# Patient Record
Sex: FEMALE | Race: WHITE | NOT HISPANIC OR LATINO | Employment: FULL TIME | URBAN - METROPOLITAN AREA
[De-identification: names, ages, dates, MRNs, and addresses within clinical notes are randomized per-mention and may not be internally consistent; named-entity substitution may affect disease eponyms.]

---

## 2019-09-11 ENCOUNTER — OFFICE VISIT (OUTPATIENT)
Dept: CARDIOLOGY | Facility: CLINIC | Age: 69
End: 2019-09-11

## 2019-09-11 VITALS
DIASTOLIC BLOOD PRESSURE: 84 MMHG | HEART RATE: 64 BPM | SYSTOLIC BLOOD PRESSURE: 128 MMHG | BODY MASS INDEX: 32.74 KG/M2 | WEIGHT: 216 LBS | HEIGHT: 68 IN

## 2019-09-11 DIAGNOSIS — E78.5 HYPERLIPIDEMIA, UNSPECIFIED HYPERLIPIDEMIA TYPE: ICD-10-CM

## 2019-09-11 DIAGNOSIS — Z79.01 CURRENT USE OF LONG TERM ANTICOAGULATION: ICD-10-CM

## 2019-09-11 DIAGNOSIS — I10 ESSENTIAL HYPERTENSION: ICD-10-CM

## 2019-09-11 DIAGNOSIS — I48.0 PAROXYSMAL ATRIAL FIBRILLATION (HCC): Primary | ICD-10-CM

## 2019-09-11 DIAGNOSIS — I49.3 PREMATURE VENTRICULAR CONTRACTIONS: ICD-10-CM

## 2019-09-11 PROCEDURE — 99213 OFFICE O/P EST LOW 20 MIN: CPT | Performed by: INTERNAL MEDICINE

## 2019-09-11 RX ORDER — CARVEDILOL 6.25 MG/1
6.25 TABLET ORAL 2 TIMES DAILY WITH MEALS
COMMUNITY
End: 2020-03-11

## 2019-09-11 NOTE — PROGRESS NOTES
[facility] [place] [facility]        Subjective:     Encounter Date:09/11/2019      Patient ID: Mami Wilson is a 69 y.o. female.      HPI: I saw Mami Wilson    today for cardiovascular care.  She is a pleasant 69-year-old female with a history of atrial fibrillation on chronic anticoagulation with Xarelto.  She remains active and is free of chest pain pressure tightness.  She does not report progressive dyspnea on exertion nor does she expands orthopnea or PND no syncope near syncope or any significant palpitations.  She tolerates her anticoagulation well without significant bruising or bleeding.  She does have obstructive sleep apnea and uses CPAP routinely.  She has a history of dyslipidemia and remains on simvastatin 20 mg/day her lipids are monitored by Dr. Rufino Carbajal, her primary care physician.  Fasting lipids from 8/14/2019 revealed triglycerides 81 HDL 64 LDL 97, liver function tests within normal limits.    She has a history of ventricular ectopy that was symptomatic but have has been very well controlled with magnesium supplementation.  She underwent nuclear stress testing 1/30/2019 which revealed preserved left ventricular function very small defect in the mid anterior wall, she is remained asymptomatic.  Last echocardiogram obtained in 2017 also reveals preserved left ventricular function, mild mitral tricuspid insufficiency.      The following portions of the patient's history were reviewed and updated as appropriate: allergies, current medications, past family history, past medical history, past social history, past surgical history and problem list.    Problem List:  Patient Active Problem List   Diagnosis   • Hyperlipidemia   • Neuropathy   • Hypertension   • Atrial fibrillation (CMS/HCC)   • GERD (gastroesophageal reflux disease)   • History of echocardiogram   • History of nuclear stress test   • History of cardiac monitoring   • Current use of long term anticoagulation   • Premature ventricular  contractions       Past Medical History:  Past Medical History:   Diagnosis Date   • Atrial fibrillation (CMS/HCC)    • GERD (gastroesophageal reflux disease)    • History of cardiac monitoring     5/17/18 - Abnormal holter monitor for frequent short run of atrial tachycardia/atrial fibrillation with frequent premature ventricular ectopy.   • History of echocardiogram     8/09/17 - LV normal size. Mild MR and TR. EF 61%.  4/03/14 - STRESS ECHO - Normal treadmill stress. No echocardiographic evidence of myocardial ischemia.   • History of nuclear stress test     01/30/2019--Fair exercise tolerance. Baseline HTN with hypertensive response. EKG changes sinus bradycardia. Small reversible defect in the mid anterior wall. EF 68%. Patient went 6min and 11 sec on Branden protocol.   • Hyperlipidemia    • Hypertension    • Neuropathy        Past Surgical History:  Past Surgical History:   Procedure Laterality Date   • BLADDER REPAIR     • CATARACT EXTRACTION     • KNEE ARTHROSCOPY     • TONSILLECTOMY         Social History:  Social History     Socioeconomic History   • Marital status:      Spouse name: Not on file   • Number of children: Not on file   • Years of education: Not on file   • Highest education level: Not on file   Tobacco Use   • Smoking status: Former Smoker       Allergies:  Allergies   Allergen Reactions   • Digoxin Unknown (See Comments)     .         ROS:  Review of Systems   Constitution: Negative for weakness and malaise/fatigue.   HENT: Negative for hearing loss and nosebleeds.    Eyes: Negative for double vision and visual disturbance.   Cardiovascular: Negative for chest pain, claudication, dyspnea on exertion, near-syncope, orthopnea, palpitations, paroxysmal nocturnal dyspnea and syncope.   Respiratory: Negative for cough, shortness of breath, sleep disturbances due to breathing, snoring, sputum production and wheezing.    Endocrine: Negative for cold intolerance, heat intolerance, polydipsia  "and polyuria.   Hematologic/Lymphatic: Negative for adenopathy and bleeding problem. Does not bruise/bleed easily.   Skin: Negative for flushing and itching.   Musculoskeletal: Negative for back pain, falls, joint pain, joint swelling, muscle weakness and neck pain.   Gastrointestinal: Negative for abdominal pain, dysphagia, heartburn, nausea and vomiting.   Genitourinary: Negative for dysuria and frequency.   Neurological: Negative for disturbances in coordination, dizziness, light-headedness, loss of balance and numbness.   Psychiatric/Behavioral: Negative for altered mental status and memory loss. The patient is not nervous/anxious.    Allergic/Immunologic: Negative for hives and persistent infections.          Objective:         /84   Pulse 64   Ht 172.7 cm (68\")   Wt 98 kg (216 lb)   BMI 32.84 kg/m²     Physical Exam   Constitutional: She is oriented to person, place, and time. She appears well-developed and well-nourished.   HENT:   Head: Normocephalic and atraumatic.   Eyes: Conjunctivae and EOM are normal. Pupils are equal, round, and reactive to light.   Neck: Neck supple. No thyromegaly present.   Cardiovascular: Normal rate, regular rhythm, S1 normal, S2 normal and intact distal pulses. PMI is not displaced. Exam reveals gallop and S4. Exam reveals no S3, no distant heart sounds, no friction rub, no midsystolic click and no opening snap.   No murmur heard.  Pulses:       Carotid pulses are 2+ on the right side, and 2+ on the left side.       Radial pulses are 2+ on the right side, and 2+ on the left side.        Femoral pulses are 2+ on the right side, and 2+ on the left side.       Dorsalis pedis pulses are 2+ on the right side, and 2+ on the left side.        Posterior tibial pulses are 2+ on the right side, and 2+ on the left side.   Pulmonary/Chest: Effort normal and breath sounds normal. No respiratory distress. She has no wheezes. She has no rales.   Abdominal: Soft. Bowel sounds are " normal. She exhibits no distension and no mass. There is no tenderness.   Musculoskeletal: Normal range of motion. She exhibits no edema.   Neurological: She is alert and oriented to person, place, and time.   Skin: Skin is warm and dry. No erythema.   Psychiatric: She has a normal mood and affect.       [facility] Procedure(s):  Procedures    ASCVD RIsk Score::  The ASCVD Risk score (Hermelinda MCKEON Jr., et al., 2013) failed to calculate for the following reasons:    Cannot find a previous HDL lab    Cannot find a previous total cholesterol lab        Assessment/Plan:         1. Paroxysmal atrial fibrillation (CMS/HCC)  Stable    2. Current use of long term anticoagulation  Well-tolerated    3. Essential hypertension  Controlled     4. Hyperlipidemia, unspecified hyperlipidemia type  Controlled    5. Premature ventricular contractions   Resolved      Ms. Wilson is stable from a cardiovascular standpoint.  She is free of angina euvolemic and active.  I made no changes in her medications.  We stressed importance of continued salt restriction as well as observing a low-cholesterol low saturated fat diet.  I will see her in follow-up 6 months sooner if needed.       Miller Gonzales MD  09/11/19  .

## 2020-03-11 ENCOUNTER — OFFICE VISIT (OUTPATIENT)
Dept: CARDIOLOGY | Facility: CLINIC | Age: 70
End: 2020-03-11

## 2020-03-11 VITALS
HEART RATE: 64 BPM | HEIGHT: 68 IN | SYSTOLIC BLOOD PRESSURE: 174 MMHG | WEIGHT: 230 LBS | DIASTOLIC BLOOD PRESSURE: 100 MMHG | BODY MASS INDEX: 34.86 KG/M2

## 2020-03-11 DIAGNOSIS — E66.09 CLASS 2 OBESITY DUE TO EXCESS CALORIES WITHOUT SERIOUS COMORBIDITY WITH BODY MASS INDEX (BMI) OF 35.0 TO 35.9 IN ADULT: ICD-10-CM

## 2020-03-11 DIAGNOSIS — I49.3 PREMATURE VENTRICULAR CONTRACTIONS: ICD-10-CM

## 2020-03-11 DIAGNOSIS — I48.0 PAROXYSMAL ATRIAL FIBRILLATION (HCC): Primary | ICD-10-CM

## 2020-03-11 DIAGNOSIS — I10 ESSENTIAL HYPERTENSION: ICD-10-CM

## 2020-03-11 DIAGNOSIS — Z79.01 CURRENT USE OF LONG TERM ANTICOAGULATION: ICD-10-CM

## 2020-03-11 DIAGNOSIS — E78.5 HYPERLIPIDEMIA, UNSPECIFIED HYPERLIPIDEMIA TYPE: ICD-10-CM

## 2020-03-11 PROCEDURE — 99214 OFFICE O/P EST MOD 30 MIN: CPT | Performed by: INTERNAL MEDICINE

## 2020-03-11 RX ORDER — LISINOPRIL 40 MG/1
40 TABLET ORAL DAILY
Qty: 90 TABLET | Refills: 3 | Status: SHIPPED | COMMUNITY
Start: 2020-03-11 | End: 2020-03-12 | Stop reason: SDUPTHER

## 2020-03-11 RX ORDER — CARVEDILOL 12.5 MG/1
12.5 TABLET ORAL 2 TIMES DAILY WITH MEALS
Qty: 180 TABLET | Refills: 3 | Status: SHIPPED | COMMUNITY
Start: 2020-03-11 | End: 2020-03-12 | Stop reason: SDUPTHER

## 2020-03-11 NOTE — PROGRESS NOTES
[facility]        Subjective:     Encounter Date:03/11/2020      Patient ID: Mami Wilson is a 69 y.o. female.      HPI: I saw Mami Wilson today for cardiovascular care.  She is a pleasant 69-year-old female with a history of atrial fibrillation and remains on long-term anticoagulation.  She tolerates her anticoagulation well no significant bruising or bleeding.  She has a history of hypertension and her blood pressure is markedly elevated in the office today at 174/100.  She denies chest pain pressure or tightness.  She does have dyspnea on exertion but feels this is stable.  She has occasional but mild lower extremity edema.  She is free of syncope or near syncope.  She has rare palpitations.  She does have obstructive sleep apnea and uses CPAP routinely.  She has a history of dyslipidemia and remains on simvastatin 20 mg daily.  Her lipids from 2/12/2020 revealed a triglyceride of 74, HDL 60, LDL 73 and liver function tests were within normal limits.  She has had about a 14 to 15 pound weight gain.    Nuclear stress test 1/30/2019 revealed preserved left ventricular contractility no significant ischemia noted.  Echocardiogram from 8/9/2017 also demonstrated preserved left ventricular function with mild mitral insufficiency.      The following portions of the patient's history were reviewed and updated as appropriate: allergies, current medications, past family history, past medical history, past social history, past surgical history and problem list.    Problem List:  Patient Active Problem List   Diagnosis   • Hyperlipidemia   • Neuropathy   • Hypertension   • Atrial fibrillation (CMS/HCC)   • GERD (gastroesophageal reflux disease)   • History of echocardiogram   • History of nuclear stress test   • History of cardiac monitoring   • Current use of long term anticoagulation   • Premature ventricular contractions   • Class 2 obesity due to excess calories without serious comorbidity with  body mass index (BMI) of 35.0 to 35.9 in adult       Past Medical History:  Past Medical History:   Diagnosis Date   • Atrial fibrillation (CMS/HCC)    • GERD (gastroesophageal reflux disease)    • History of cardiac monitoring     5/17/18 - Abnormal holter monitor for frequent short run of atrial tachycardia/atrial fibrillation with frequent premature ventricular ectopy.   • History of echocardiogram     8/09/17 - LV normal size. Mild MR and TR. EF 61%.  4/03/14 - STRESS ECHO - Normal treadmill stress. No echocardiographic evidence of myocardial ischemia.   • History of nuclear stress test     01/30/2019--Fair exercise tolerance. Baseline HTN with hypertensive response. EKG changes sinus bradycardia. Small reversible defect in the mid anterior wall. EF 68%. Patient went 6min and 11 sec on Branden protocol.   • Hyperlipidemia    • Hypertension    • Neuropathy        Past Surgical History:  Past Surgical History:   Procedure Laterality Date   • BLADDER REPAIR     • CATARACT EXTRACTION     • KNEE ARTHROSCOPY     • TONSILLECTOMY         Social History:  Social History     Socioeconomic History   • Marital status:      Spouse name: Not on file   • Number of children: Not on file   • Years of education: Not on file   • Highest education level: Not on file   Tobacco Use   • Smoking status: Former Smoker   • Smokeless tobacco: Never Used   Substance and Sexual Activity   • Alcohol use: Not Currently   • Drug use: Never       Allergies:  Allergies   Allergen Reactions   • Digoxin Other (See Comments)     .facial tigling         ROS:  Review of Systems   Constitution: Negative for malaise/fatigue.   HENT: Negative for hearing loss and nosebleeds.    Eyes: Negative for double vision and visual disturbance.   Cardiovascular: Positive for dyspnea on exertion, leg swelling and palpitations. Negative for chest pain, claudication, near-syncope, orthopnea, paroxysmal nocturnal dyspnea and syncope.   Respiratory: Negative for  "cough, shortness of breath, sleep disturbances due to breathing, snoring, sputum production and wheezing.    Endocrine: Negative for cold intolerance, heat intolerance, polydipsia and polyuria.   Hematologic/Lymphatic: Negative for adenopathy and bleeding problem. Does not bruise/bleed easily.   Skin: Negative for flushing and itching.   Musculoskeletal: Negative for back pain, falls, joint pain, joint swelling, muscle weakness and neck pain.   Gastrointestinal: Negative for abdominal pain, dysphagia, heartburn, nausea and vomiting.   Genitourinary: Negative for dysuria and frequency.   Neurological: Negative for disturbances in coordination, dizziness, light-headedness, loss of balance, numbness and weakness.   Psychiatric/Behavioral: Negative for altered mental status and memory loss. The patient is not nervous/anxious.    Allergic/Immunologic: Negative for hives and persistent infections.          Objective:         /100   Pulse 64   Ht 172.7 cm (68\")   Wt 104 kg (230 lb)   BMI 34.97 kg/m²     Physical Exam   Constitutional: She is oriented to person, place, and time. She appears well-developed and well-nourished.   HENT:   Head: Normocephalic and atraumatic.   Eyes: Pupils are equal, round, and reactive to light. Conjunctivae and EOM are normal.   Neck: Neck supple. No thyromegaly present.   Cardiovascular: Normal rate, regular rhythm, S1 normal, S2 normal and intact distal pulses. PMI is not displaced. Exam reveals gallop and S4. Exam reveals no S3, no distant heart sounds, no friction rub, no midsystolic click and no opening snap.   No murmur heard.  Pulses:       Carotid pulses are 2+ on the right side, and 2+ on the left side.       Radial pulses are 2+ on the right side, and 2+ on the left side.        Femoral pulses are 2+ on the right side, and 2+ on the left side.       Dorsalis pedis pulses are 2+ on the right side, and 2+ on the left side.        Posterior tibial pulses are 2+ on the right " side, and 2+ on the left side.   Pulmonary/Chest: Effort normal and breath sounds normal. No respiratory distress. She has no wheezes. She has no rales.   Abdominal: Soft. Bowel sounds are normal. She exhibits no distension and no mass. There is no tenderness.   Musculoskeletal: Normal range of motion. She exhibits no edema.   Neurological: She is alert and oriented to person, place, and time.   Skin: Skin is warm and dry. No erythema.   Psychiatric: She has a normal mood and affect.       In-Office Procedure(s):  Procedures    ASCVD RIsk Score::  The ASCVD Risk score (Hermelinda MIKE Jr., et al., 2013) failed to calculate for the following reasons:    Cannot find a previous HDL lab    Cannot find a previous total cholesterol lab        Assessment/Plan:         1. Essential hypertension  Target less than 130/80    2. Paroxysmal atrial fibrillation (CMS/HCC)  Stable    3. Current use of long term anticoagulation  Well-tolerated    4. Hyperlipidemia, unspecified hyperlipidemia type  Controlled    5. Premature ventricular contractions  Controlled    6. Class 2 obesity due to excess calories without serious comorbidity with body mass index (BMI) of 35.0 to 35.9 in adult  Encourage weight reduction    I have counseled Ms. Wilson on hypertension control.  I have encouraged her to observe a low-cholesterol low saturated fat weight reduction diet and initiate a walking exercise program.  In addition I have discussed and counseled her on salt restriction is close to 2000 mg a day as possible.  I will advance lisinopril to 40 mg daily as well as carvedilol to 12.5 mg twice daily.  She will monitor her blood pressure and contact me if it exceeds 130/80.  We will advance carvedilol further and consider the addition of amlodipine if needed.         Miller Gonzales MD  03/11/20  .

## 2020-03-12 ENCOUNTER — TELEPHONE (OUTPATIENT)
Dept: CARDIOLOGY | Facility: CLINIC | Age: 70
End: 2020-03-12

## 2020-03-12 RX ORDER — CARVEDILOL 12.5 MG/1
12.5 TABLET ORAL 2 TIMES DAILY WITH MEALS
Qty: 180 TABLET | Refills: 3 | Status: SHIPPED | OUTPATIENT
Start: 2020-03-12 | End: 2020-12-02

## 2020-03-12 RX ORDER — LISINOPRIL 40 MG/1
40 TABLET ORAL DAILY
Qty: 90 TABLET | Refills: 3 | Status: SHIPPED | OUTPATIENT
Start: 2020-03-12 | End: 2021-03-15

## 2020-03-12 NOTE — TELEPHONE ENCOUNTER
Ms Wilson is needing refills      Lisinopril 40 mg 1 x daily  90 day supply    Carvedilol 12.5 mg  2 x daily  90 day supply    Does not have any medication ran out.    Dr Gonzales changed her medication, thought he sent in at her visit yesterday. Pharmacy does not have. Requesting that it be resent. Thank you    [phone]

## 2020-03-23 ENCOUNTER — TELEPHONE (OUTPATIENT)
Dept: CARDIOLOGY | Facility: CLINIC | Age: 70
End: 2020-03-23

## 2020-03-23 NOTE — TELEPHONE ENCOUNTER
DR Gonzales Increased Carvedilol to BID    3/12/20 148/93  3/13/20 158/95  3/16/20 149/89  3/17/20 149/84  3/18/20 146/87  3/19/20 156/93  3/20/20 149/74  3/21/20 148/80  3/22/20 125/86  3/23/20 133/81

## 2020-03-24 NOTE — TELEPHONE ENCOUNTER
Acacia, let us continue her carvedilol at 12.5 mg twice a day as long as her blood pressure remains under 130/80.  If it exceeds 130/80 will need to go to 25 mg twice daily.  Thank you

## 2020-09-30 ENCOUNTER — OFFICE VISIT (OUTPATIENT)
Dept: CARDIOLOGY | Facility: CLINIC | Age: 70
End: 2020-09-30

## 2020-09-30 VITALS
WEIGHT: 234 LBS | HEIGHT: 68 IN | SYSTOLIC BLOOD PRESSURE: 132 MMHG | RESPIRATION RATE: 18 BRPM | HEART RATE: 56 BPM | OXYGEN SATURATION: 97 % | DIASTOLIC BLOOD PRESSURE: 80 MMHG | BODY MASS INDEX: 35.46 KG/M2

## 2020-09-30 DIAGNOSIS — E78.5 HYPERLIPIDEMIA, UNSPECIFIED HYPERLIPIDEMIA TYPE: ICD-10-CM

## 2020-09-30 DIAGNOSIS — Z79.01 CURRENT USE OF LONG TERM ANTICOAGULATION: ICD-10-CM

## 2020-09-30 DIAGNOSIS — I48.0 PAROXYSMAL ATRIAL FIBRILLATION (HCC): ICD-10-CM

## 2020-09-30 DIAGNOSIS — E66.09 CLASS 2 OBESITY DUE TO EXCESS CALORIES WITHOUT SERIOUS COMORBIDITY WITH BODY MASS INDEX (BMI) OF 35.0 TO 35.9 IN ADULT: ICD-10-CM

## 2020-09-30 DIAGNOSIS — I10 ESSENTIAL HYPERTENSION: Primary | ICD-10-CM

## 2020-09-30 DIAGNOSIS — I49.3 PREMATURE VENTRICULAR CONTRACTIONS: ICD-10-CM

## 2020-09-30 PROCEDURE — 99214 OFFICE O/P EST MOD 30 MIN: CPT | Performed by: INTERNAL MEDICINE

## 2020-09-30 RX ORDER — HYDRALAZINE HYDROCHLORIDE 25 MG/1
25 TABLET, FILM COATED ORAL 2 TIMES DAILY
Qty: 180 TABLET | Refills: 3 | Status: SHIPPED | OUTPATIENT
Start: 2020-09-30 | End: 2020-11-04 | Stop reason: ALTCHOICE

## 2020-09-30 NOTE — PROGRESS NOTES
[facility] [place] [facility]        Subjective:     Encounter Date:09/30/2020      Patient ID: Mami Wilson is a 70 y.o. female.      HPI: I saw Mami Wilson today for cardiovascular care.  She is a pleasant 70-year-old female who observes CDC guidelines for social distancing.  She remains free of fever cough or increased shortness of breath.  She does not report any change in her sense of smell or taste.  Ms. Foreman denies chest pain pressure or tightness.  She does not experience any significant dyspnea on exertion and is free of orthopnea or PND and no current lower extremity edema.  She has a history of hypertension which is been monitored at home and consistently above 130/80.  Her blood pressure in the office today is 132/80.  We previously initiated amlodipine but this caused lower extremity edema and amlodipine was discontinued.  She denies syncope or near syncope she reports rare palpitations.  She has a history of dyslipidemia and is on simvastatin 20 mg daily.  Fasting lipid profile obtained 8/5/2020 revealed triglycerides 141, HDL 50, LDL 59.  Ms. Combs was found to have atrial fibrillation.  She is on long-term anticoagulation with Xarelto 20 mg daily for stroke risk reduction.  She tolerates medications well no reported bleeding or bruising.      The following portions of the patient's history were reviewed and updated as appropriate: allergies, current medications, past family history, past medical history, past social history, past surgical history and problem list.    Problem List:  Patient Active Problem List   Diagnosis   • Hyperlipidemia   • Neuropathy   • Hypertension   • Atrial fibrillation (CMS/HCC)   • GERD (gastroesophageal reflux disease)   • History of echocardiogram   • History of nuclear stress test   • History of cardiac monitoring   • Current use of long term anticoagulation   • Premature ventricular contractions   • Class 2 obesity due to excess calories without serious comorbidity  with body mass index (BMI) of 35.0 to 35.9 in adult       Past Medical History:  Past Medical History:   Diagnosis Date   • Atrial fibrillation (CMS/HCC)    • GERD (gastroesophageal reflux disease)    • History of cardiac monitoring     5/17/18 - Abnormal holter monitor for frequent short run of atrial tachycardia/atrial fibrillation with frequent premature ventricular ectopy.   • History of echocardiogram     8/09/17 - LV normal size. Mild MR and TR. EF 61%.  4/03/14 - STRESS ECHO - Normal treadmill stress. No echocardiographic evidence of myocardial ischemia.   • History of nuclear stress test     01/30/2019--Fair exercise tolerance. Baseline HTN with hypertensive response. EKG changes sinus bradycardia. Small reversible defect in the mid anterior wall. EF 68%. Patient went 6min and 11 sec on Branden protocol.   • Hyperlipidemia    • Hypertension    • Neuropathy        Past Surgical History:  Past Surgical History:   Procedure Laterality Date   • BLADDER REPAIR     • CATARACT EXTRACTION     • KNEE ARTHROSCOPY     • TONSILLECTOMY         Social History:  Social History     Socioeconomic History   • Marital status:      Spouse name: Not on file   • Number of children: Not on file   • Years of education: Not on file   • Highest education level: Not on file   Tobacco Use   • Smoking status: Former Smoker   • Smokeless tobacco: Never Used   Substance and Sexual Activity   • Alcohol use: Not Currently   • Drug use: Never   • Sexual activity: Defer       Allergies:  Allergies   Allergen Reactions   • Digoxin Other (See Comments)     .facial tigling         ROS:  Review of Systems   Constitution: Negative for malaise/fatigue.   HENT: Negative for hearing loss and nosebleeds.    Eyes: Negative for double vision and visual disturbance.   Cardiovascular: Positive for palpitations. Negative for chest pain, claudication, dyspnea on exertion, near-syncope, orthopnea, paroxysmal nocturnal dyspnea and syncope.   Respiratory:  "Negative for cough, shortness of breath, sleep disturbances due to breathing, snoring, sputum production and wheezing.    Endocrine: Negative for cold intolerance, heat intolerance, polydipsia and polyuria.   Hematologic/Lymphatic: Negative for adenopathy and bleeding problem. Does not bruise/bleed easily.   Skin: Negative for flushing and itching.   Musculoskeletal: Negative for back pain, falls, joint pain, joint swelling, muscle weakness and neck pain.   Gastrointestinal: Negative for abdominal pain, dysphagia, heartburn, nausea and vomiting.   Genitourinary: Negative for dysuria and frequency.   Neurological: Negative for disturbances in coordination, dizziness, light-headedness, loss of balance, numbness and weakness.   Psychiatric/Behavioral: Negative for altered mental status and memory loss. The patient is not nervous/anxious.    Allergic/Immunologic: Negative for hives and persistent infections.          Objective:         /80 (BP Location: Left arm, Patient Position: Sitting, Cuff Size: Large Adult)   Pulse 56   Resp 18   Ht 172.7 cm (68\")   Wt 106 kg (234 lb)   SpO2 97%   BMI 35.58 kg/m²     Constitutional:       Appearance: Well-developed.   Eyes:      Conjunctiva/sclera: Conjunctivae normal.      Pupils: Pupils are equal, round, and reactive to light.   HENT:      Head: Normocephalic and atraumatic.   Neck:      Musculoskeletal: Neck supple.      Thyroid: No thyromegaly.   Pulmonary:      Effort: Pulmonary effort is normal. No respiratory distress.      Breath sounds: Normal breath sounds. No wheezing. No rales.   Cardiovascular:      Normal rate. Regular rhythm.      S4 Gallop. No S3 gallop. Midsystolic click click.   Edema:     Peripheral edema absent.   Abdominal:      General: Bowel sounds are normal. There is no distension.      Palpations: Abdomen is soft. There is no abdominal mass.      Tenderness: There is no abdominal tenderness.   Musculoskeletal: Normal range of motion.   Skin:   "   General: Skin is warm and dry.      Findings: No erythema.   Neurological:      Mental Status: Alert and oriented to person, place, and time.         In-Office Procedure(s):  Procedures    ASCVD RIsk Score::  The ASCVD Risk score (Hermelindafrancisco MCKEON Jr., et al., 2013) failed to calculate for the following reasons:    Cannot find a previous HDL lab    Cannot find a previous total cholesterol lab        Assessment/Plan:         1. Essential hypertension  Target less than 130/80  - hydrALAZINE (APRESOLINE) 25 MG tablet; Take 1 tablet by mouth 2 (Two) Times a Day.  Dispense: 180 tablet; Refill: 3    2. Paroxysmal atrial fibrillation (CMS/HCC)  Stable    3. Current use of long term anticoagulation  Stable    4. Hyperlipidemia, unspecified hyperlipidemia type  Controlled    5. Premature ventricular contractions  Stable    6. Class 2 obesity due to excess calories without serious comorbidity with body mass index (BMI) of 35.0 to 35.9 in adult  Encourage weight reduction    Ms. James is free of angina and euvolemic.  She maintains a good activity level while observing CDC guidelines for social distancing.  I counseled her on the importance of salt restriction is close to 2000 mg a day as possible as well as weight reduction and aerobic activity.  I will initiate hydralazine 25 mg twice daily.  She will monitor her blood pressure at home and contact me if it exceeds 130/80.  I will tentatively plan to see her in follow-up in 6 months but of course sooner if needed.  I encouraged her to continue her long-term anticoagulation for stroke risk reduction.           Miller Gonzales MD  09/30/20  .

## 2020-10-13 ENCOUNTER — TELEPHONE (OUTPATIENT)
Dept: CARDIOLOGY | Facility: CLINIC | Age: 70
End: 2020-10-13

## 2020-10-13 NOTE — TELEPHONE ENCOUNTER
CNA PT    PT went to [facility] for Afib. I made PT APT for 10/28/20. Unless the records show that she needs to be seen sooner.

## 2020-11-04 ENCOUNTER — OFFICE VISIT (OUTPATIENT)
Dept: CARDIOLOGY | Facility: CLINIC | Age: 70
End: 2020-11-04

## 2020-11-04 VITALS
WEIGHT: 237 LBS | DIASTOLIC BLOOD PRESSURE: 82 MMHG | BODY MASS INDEX: 35.92 KG/M2 | SYSTOLIC BLOOD PRESSURE: 122 MMHG | HEART RATE: 62 BPM | HEIGHT: 68 IN

## 2020-11-04 DIAGNOSIS — R07.2 PRECORDIAL PAIN: ICD-10-CM

## 2020-11-04 DIAGNOSIS — I10 ESSENTIAL HYPERTENSION: ICD-10-CM

## 2020-11-04 DIAGNOSIS — E66.01 MORBIDLY OBESE (HCC): ICD-10-CM

## 2020-11-04 DIAGNOSIS — I48.0 PAROXYSMAL ATRIAL FIBRILLATION (HCC): Primary | ICD-10-CM

## 2020-11-04 DIAGNOSIS — R06.89 RESPIRATORY INSUFFICIENCY: ICD-10-CM

## 2020-11-04 DIAGNOSIS — Z99.89 OBSTRUCTIVE SLEEP APNEA ON CPAP: ICD-10-CM

## 2020-11-04 DIAGNOSIS — Z79.01 CURRENT USE OF LONG TERM ANTICOAGULATION: ICD-10-CM

## 2020-11-04 DIAGNOSIS — I49.3 PREMATURE VENTRICULAR CONTRACTIONS: ICD-10-CM

## 2020-11-04 DIAGNOSIS — G47.33 OBSTRUCTIVE SLEEP APNEA ON CPAP: ICD-10-CM

## 2020-11-04 DIAGNOSIS — E78.5 HYPERLIPIDEMIA, UNSPECIFIED HYPERLIPIDEMIA TYPE: ICD-10-CM

## 2020-11-04 PROCEDURE — 99214 OFFICE O/P EST MOD 30 MIN: CPT | Performed by: INTERNAL MEDICINE

## 2020-11-04 RX ORDER — DILTIAZEM HYDROCHLORIDE 180 MG/1
180 CAPSULE, COATED, EXTENDED RELEASE ORAL DAILY
Qty: 30 CAPSULE | Refills: 5 | Status: SHIPPED | OUTPATIENT
Start: 2020-11-04 | End: 2020-12-07

## 2020-11-04 NOTE — PROGRESS NOTES
[facility]        Subjective:     Encounter Date:11/04/2020      Patient ID: Mami Wilson is a 70 y.o. female.      HPI: I saw Mami Wilson today for continued cardiovascular care.  She is a pleasant 70-year-old female who observes the CDC guidelines for social distancing.  She is free of fever cough or increased shortness of breath she does not report any change in her sense of smell or taste.  Ms. Martinez has a history of paroxysmal atrial fibrillation.  She has had 2 episodes one on 10/12/2020 and then again on 10/24/2020.  She presented with a rapid ventricular response to [facility] requiring IV Cardizem for rate control and she subsequently converted to sinus rhythm.  She is maintained on long-term anticoagulation with Xarelto which he tolerates well.  She does report some mid chest discomfort.  She describes this as a heaviness which is associated with shortness of breath.  This occurs with exertion such as walking up her stairs in her home.  She states that the symptoms are relieved with rest.  She denies orthopnea or PND there is no significant lower extremity edema.  She is free of syncope or near syncope she does have her palpitations with atrial fibrillation.  She has obstructive sleep apnea and uses CPAP routinely.  She is mildly obese with a BMI of 36.04.  She has a history of dyslipidemia and remains on simvastatin 20 mg daily.  She is known to have gastroesophageal reflux disease and is maintained on pantoprazole 40 mg nightly.  Her blood pressure in the office today is controlled at 122/82 with a heart rate of 62.    The following portions of the patient's history were reviewed and updated as appropriate: allergies, current medications, past family history, past medical history, past social history, past surgical history and problem list.    Problem List:  Patient Active Problem List   Diagnosis   • Hyperlipidemia   • Neuropathy   • Hypertension   • Atrial  fibrillation (CMS/HCC)   • GERD (gastroesophageal reflux disease)   • History of echocardiogram   • History of nuclear stress test   • History of cardiac monitoring   • Current use of long term anticoagulation   • Premature ventricular contractions   • Morbidly obese (CMS/HCC)       Past Medical History:  Past Medical History:   Diagnosis Date   • Atrial fibrillation (CMS/HCC)    • GERD (gastroesophageal reflux disease)    • History of cardiac monitoring     5/17/18 - Abnormal holter monitor for frequent short run of atrial tachycardia/atrial fibrillation with frequent premature ventricular ectopy.   • History of echocardiogram     8/09/17 - LV normal size. Mild MR and TR. EF 61%.  4/03/14 - STRESS ECHO - Normal treadmill stress. No echocardiographic evidence of myocardial ischemia.   • History of nuclear stress test     01/30/2019--Fair exercise tolerance. Baseline HTN with hypertensive response. EKG changes sinus bradycardia. Small reversible defect in the mid anterior wall. EF 68%. Patient went 6min and 11 sec on Branden protocol.   • Hyperlipidemia    • Hypertension    • Neuropathy        Past Surgical History:  Past Surgical History:   Procedure Laterality Date   • BLADDER REPAIR     • CATARACT EXTRACTION     • KNEE ARTHROSCOPY     • TONSILLECTOMY         Social History:  Social History     Socioeconomic History   • Marital status:      Spouse name: Not on file   • Number of children: Not on file   • Years of education: Not on file   • Highest education level: Not on file   Tobacco Use   • Smoking status: Former Smoker   • Smokeless tobacco: Never Used   Substance and Sexual Activity   • Alcohol use: Not Currently   • Drug use: Never   • Sexual activity: Defer       Allergies:  Allergies   Allergen Reactions   • Digoxin Other (See Comments)     .facial tigling         ROS:  Review of Systems   Constitution: Positive for malaise/fatigue.   HENT: Negative for hearing loss and nosebleeds.    Eyes: Negative  "for double vision and visual disturbance.   Cardiovascular: Positive for chest pain, dyspnea on exertion and palpitations. Negative for claudication, near-syncope, orthopnea, paroxysmal nocturnal dyspnea and syncope.   Respiratory: Negative for cough, shortness of breath, sleep disturbances due to breathing, snoring, sputum production and wheezing.    Endocrine: Negative for cold intolerance, heat intolerance, polydipsia and polyuria.   Hematologic/Lymphatic: Negative for adenopathy and bleeding problem. Does not bruise/bleed easily.   Skin: Negative for flushing and itching.   Musculoskeletal: Negative for back pain, falls, joint pain, joint swelling, muscle weakness and neck pain.   Gastrointestinal: Negative for abdominal pain, dysphagia, heartburn, nausea and vomiting.   Genitourinary: Negative for dysuria and frequency.   Neurological: Negative for disturbances in coordination, dizziness, light-headedness, loss of balance, numbness and weakness.   Psychiatric/Behavioral: Negative for altered mental status and memory loss. The patient is not nervous/anxious.    Allergic/Immunologic: Negative for hives and persistent infections.          Objective:         /82   Pulse 62   Ht 172.7 cm (68\")   Wt 108 kg (237 lb)   BMI 36.04 kg/m²     Constitutional:       Appearance: Well-developed.   Eyes:      Conjunctiva/sclera: Conjunctivae normal.      Pupils: Pupils are equal, round, and reactive to light.   HENT:      Head: Normocephalic and atraumatic.   Neck:      Musculoskeletal: Neck supple.      Thyroid: No thyromegaly.   Pulmonary:      Effort: Pulmonary effort is normal. No respiratory distress.      Breath sounds: Normal breath sounds. No wheezing. No rales.   Cardiovascular:      Normal rate. Regular rhythm.      S4 Gallop. No S3 gallop. Midsystolic click click.   Edema:     Peripheral edema absent.   Abdominal:      General: Bowel sounds are normal. There is no distension.      Palpations: Abdomen is " soft. There is no abdominal mass.      Tenderness: There is no abdominal tenderness.   Musculoskeletal: Normal range of motion.   Skin:     General: Skin is warm and dry.      Findings: No erythema.   Neurological:      Mental Status: Alert and oriented to person, place, and time.     No change in today's physical exam    In-Office Procedure(s):  Procedures    ASCVD RIsk Score::  The ASCVD Risk score (Hermelinda MIKE Jr., et al., 2013) failed to calculate for the following reasons:    Cannot find a previous HDL lab    Cannot find a previous total cholesterol lab        Assessment/Plan:         1. Paroxysmal atrial fibrillation (CMS/HCC)  Recurrent and symptomatic    2. Current use of long term anticoagulation  Well-tolerated    3. Essential hypertension  Controlled    4. Hyperlipidemia, unspecified hyperlipidemia type  Continue low-cholesterol low saturated fat diet and simvastatin 20 mg daily    5. Morbidly obese (CMS/HCC)  Encourage weight reduction    6. Premature ventricular contractions  Stable    7. Precordial pain  Recurrent and exertional    8. Respiratory insufficiency  Euvolemic    9. Obstructive sleep apnea on CPAP  Weight reduction continue CPAP    Ms. Wilson reports recurrent exertional dyspnea and chest discomfort.  We will proceed with stress Cardiolite noninvasive ischemic assessment.  In addition she has had 2 recent episodes of paroxysmal atrial fibrillation with rapid ventricular response.  She is currently maintained on long-term anticoagulation which she tolerates well.  In addition she is on carvedilol 25 mg twice daily I will discontinue hydralazine and utilize Cardizem  mg daily.  We will plan to see her in follow-up in 4 to 5 weeks or sooner if needed.       Miller Gonzales MD  11/04/20  .

## 2020-11-16 ENCOUNTER — HOSPITAL ENCOUNTER (OUTPATIENT)
Dept: CARDIOLOGY | Facility: HOSPITAL | Age: 70
Discharge: HOME | End: 2020-11-16
Admitting: INTERNAL MEDICINE

## 2020-11-16 ENCOUNTER — HOSPITAL ENCOUNTER (OUTPATIENT)
Dept: NUCLEAR MEDICINE | Facility: HOSPITAL | Age: 70
Discharge: HOME | End: 2020-11-16

## 2020-11-16 VITALS
BODY MASS INDEX: 35.92 KG/M2 | DIASTOLIC BLOOD PRESSURE: 88 MMHG | WEIGHT: 237 LBS | HEART RATE: 56 BPM | HEIGHT: 68 IN | SYSTOLIC BLOOD PRESSURE: 168 MMHG

## 2020-11-16 DIAGNOSIS — R07.2 PRECORDIAL PAIN: ICD-10-CM

## 2020-11-16 DIAGNOSIS — I48.0 PAROXYSMAL ATRIAL FIBRILLATION (HCC): ICD-10-CM

## 2020-11-16 DIAGNOSIS — R06.89 RESPIRATORY INSUFFICIENCY: ICD-10-CM

## 2020-11-16 PROCEDURE — A9500 TC99M SESTAMIBI: HCPCS | Performed by: INTERNAL MEDICINE

## 2020-11-16 PROCEDURE — 78452 HT MUSCLE IMAGE SPECT MULT: CPT | Performed by: INTERNAL MEDICINE

## 2020-11-16 PROCEDURE — 0 TECHNETIUM SESTAMIBI: Performed by: INTERNAL MEDICINE

## 2020-11-16 PROCEDURE — 78452 HT MUSCLE IMAGE SPECT MULT: CPT

## 2020-11-16 PROCEDURE — 93306 TTE W/DOPPLER COMPLETE: CPT

## 2020-11-16 PROCEDURE — 25010000002 REGADENOSON 0.4 MG/5ML SOLUTION: Performed by: INTERNAL MEDICINE

## 2020-11-16 PROCEDURE — 93306 TTE W/DOPPLER COMPLETE: CPT | Performed by: INTERNAL MEDICINE

## 2020-11-16 PROCEDURE — 93018 CV STRESS TEST I&R ONLY: CPT | Performed by: INTERNAL MEDICINE

## 2020-11-16 PROCEDURE — 93017 CV STRESS TEST TRACING ONLY: CPT

## 2020-11-16 RX ADMIN — TECHNETIUM TC 99M SESTAMIBI 1 DOSE: 1 INJECTION INTRAVENOUS at 07:40

## 2020-11-16 RX ADMIN — REGADENOSON 0.4 MG: 0.08 INJECTION, SOLUTION INTRAVENOUS at 09:15

## 2020-11-16 RX ADMIN — TECHNETIUM TC 99M SESTAMIBI 1 DOSE: 1 INJECTION INTRAVENOUS at 09:15

## 2020-11-17 LAB
BH CV NUCLEAR PRIOR STUDY: 3
BH CV STRESS BP STAGE 1: NORMAL
BH CV STRESS DURATION MIN STAGE 1: 3
BH CV STRESS DURATION SEC STAGE 1: 0
BH CV STRESS GRADE STAGE 1: 10
BH CV STRESS HR STAGE 1: 106
BH CV STRESS METS STAGE 1: 5
BH CV STRESS PROTOCOL 1: NORMAL
BH CV STRESS PROTOCOL 2 BP STAGE 1: NORMAL
BH CV STRESS PROTOCOL 2 BP STAGE 2: NORMAL
BH CV STRESS PROTOCOL 2 BP STAGE 3: 80
BH CV STRESS PROTOCOL 2 COMMENTS STAGE 1: NORMAL
BH CV STRESS PROTOCOL 2 COMMENTS STAGE 2: NORMAL
BH CV STRESS PROTOCOL 2 DOSE REGADENOSON STAGE 1: 0.4
BH CV STRESS PROTOCOL 2 DURATION MIN STAGE 1: 0
BH CV STRESS PROTOCOL 2 DURATION MIN STAGE 2: 4
BH CV STRESS PROTOCOL 2 DURATION SEC STAGE 1: 10
BH CV STRESS PROTOCOL 2 DURATION SEC STAGE 2: 0
BH CV STRESS PROTOCOL 2 HR STAGE 1: 86
BH CV STRESS PROTOCOL 2 HR STAGE 2: 93
BH CV STRESS PROTOCOL 2 HR STAGE 3: NORMAL
BH CV STRESS PROTOCOL 2 STAGE 1: 1
BH CV STRESS PROTOCOL 2 STAGE 2: 2
BH CV STRESS PROTOCOL 2 STAGE 3: 3
BH CV STRESS PROTOCOL 2: NORMAL
BH CV STRESS RECOVERY BP: NORMAL MMHG
BH CV STRESS RECOVERY HR: 76 BPM
BH CV STRESS SPEED STAGE 1: 1.7
BH CV STRESS STAGE 1: 1
LV EF NUC BP: 63 %
MAXIMAL PREDICTED HEART RATE: 152 BPM
PERCENT MAX PREDICTED HR: 69.74 %
STRESS BASELINE BP: NORMAL MMHG
STRESS BASELINE HR: 71 BPM
STRESS PERCENT HR: 82 %
STRESS POST PEAK BP: NORMAL MMHG
STRESS POST PEAK HR: 106 BPM
STRESS TARGET HR: 129 BPM

## 2020-11-18 ENCOUNTER — TELEPHONE (OUTPATIENT)
Dept: CARDIOLOGY | Facility: CLINIC | Age: 70
End: 2020-11-18

## 2020-11-19 LAB
BH CV ECHO MEAS - ACS: 1.9 CM
BH CV ECHO MEAS - AO MAX PG (FULL): 2 MMHG
BH CV ECHO MEAS - AO MAX PG: 11.2 MMHG
BH CV ECHO MEAS - AO MEAN PG (FULL): 0.86 MMHG
BH CV ECHO MEAS - AO MEAN PG: 6 MMHG
BH CV ECHO MEAS - AO ROOT AREA (BSA CORRECTED): 1.3
BH CV ECHO MEAS - AO ROOT AREA: 6.7 CM^2
BH CV ECHO MEAS - AO ROOT DIAM: 2.9 CM
BH CV ECHO MEAS - AO V2 MAX: 167.7 CM/SEC
BH CV ECHO MEAS - AO V2 MEAN: 113.5 CM/SEC
BH CV ECHO MEAS - AO V2 VTI: 40.8 CM
BH CV ECHO MEAS - AORTIC HR: 58.7 BPM
BH CV ECHO MEAS - AORTIC R-R: 1 SEC
BH CV ECHO MEAS - ASC AORTA: 3.4 CM
BH CV ECHO MEAS - AVA(I,A): 3.2 CM^2
BH CV ECHO MEAS - AVA(I,D): 3.2 CM^2
BH CV ECHO MEAS - AVA(V,A): 2.9 CM^2
BH CV ECHO MEAS - AVA(V,D): 2.9 CM^2
BH CV ECHO MEAS - BSA(HAYCOCK): 2.3 M^2
BH CV ECHO MEAS - BSA: 2.2 M^2
BH CV ECHO MEAS - BZI_BMI: 36 KILOGRAMS/M^2
BH CV ECHO MEAS - BZI_METRIC_HEIGHT: 172.7 CM
BH CV ECHO MEAS - BZI_METRIC_WEIGHT: 107.5 KG
BH CV ECHO MEAS - CI(AO): 7.3 L/MIN/M^2
BH CV ECHO MEAS - CI(LVOT): 3.4 L/MIN/M^2
BH CV ECHO MEAS - CO(AO): 16.1 L/MIN
BH CV ECHO MEAS - CO(LVOT): 7.6 L/MIN
BH CV ECHO MEAS - EDV(CUBED): 83.6 ML
BH CV ECHO MEAS - EDV(MOD-SP2): 116.7 ML
BH CV ECHO MEAS - EDV(MOD-SP4): 114 ML
BH CV ECHO MEAS - EDV(TEICH): 86.4 ML
BH CV ECHO MEAS - EF(CUBED): 72.5 %
BH CV ECHO MEAS - EF(MOD-BP): 68 %
BH CV ECHO MEAS - EF(MOD-SP2): 67.2 %
BH CV ECHO MEAS - EF(MOD-SP4): 67.6 %
BH CV ECHO MEAS - EF(TEICH): 64.4 %
BH CV ECHO MEAS - ESV(CUBED): 23 ML
BH CV ECHO MEAS - ESV(MOD-SP2): 38.3 ML
BH CV ECHO MEAS - ESV(MOD-SP4): 36.9 ML
BH CV ECHO MEAS - ESV(TEICH): 30.7 ML
BH CV ECHO MEAS - FS: 34.9 %
BH CV ECHO MEAS - IVS/LVPW: 0.97
BH CV ECHO MEAS - IVSD: 1 CM
BH CV ECHO MEAS - LA DIMENSION(2D): 4.4 CM
BH CV ECHO MEAS - LV DIASTOLIC VOL/BSA (35-75): 51.9 ML/M^2
BH CV ECHO MEAS - LV MASS(C)D: 159.6 GRAMS
BH CV ECHO MEAS - LV MASS(C)DI: 72.6 GRAMS/M^2
BH CV ECHO MEAS - LV MAX PG: 9.3 MMHG
BH CV ECHO MEAS - LV MEAN PG: 5.2 MMHG
BH CV ECHO MEAS - LV SYSTOLIC VOL/BSA (12-30): 16.8 ML/M^2
BH CV ECHO MEAS - LV V1 MAX: 152.1 CM/SEC
BH CV ECHO MEAS - LV V1 MEAN: 105.7 CM/SEC
BH CV ECHO MEAS - LV V1 VTI: 40.1 CM
BH CV ECHO MEAS - LVIDD: 4.4 CM
BH CV ECHO MEAS - LVIDS: 2.8 CM
BH CV ECHO MEAS - LVOT AREA: 3.2 CM^2
BH CV ECHO MEAS - LVOT DIAM: 2 CM
BH CV ECHO MEAS - LVPWD: 1.1 CM
BH CV ECHO MEAS - MR MAX PG: 141.8 MMHG
BH CV ECHO MEAS - MR MAX VEL: 595.4 CM/SEC
BH CV ECHO MEAS - MV A MAX VEL: 76 CM/SEC
BH CV ECHO MEAS - MV DEC SLOPE: 445.6 CM/SEC^2
BH CV ECHO MEAS - MV DEC TIME: 0.2 SEC
BH CV ECHO MEAS - MV E MAX VEL: 87.5 CM/SEC
BH CV ECHO MEAS - MV E/A: 1.2
BH CV ECHO MEAS - MV MAX PG: 3.7 MMHG
BH CV ECHO MEAS - MV MEAN PG: 1.4 MMHG
BH CV ECHO MEAS - MV V2 MAX: 95.8 CM/SEC
BH CV ECHO MEAS - MV V2 MEAN: 53.2 CM/SEC
BH CV ECHO MEAS - MV V2 VTI: 30 CM
BH CV ECHO MEAS - MVA(VTI): 4.3 CM^2
BH CV ECHO MEAS - PA MAX PG (FULL): 2.8 MMHG
BH CV ECHO MEAS - PA MAX PG: 3.9 MMHG
BH CV ECHO MEAS - PA MEAN PG (FULL): 1.7 MMHG
BH CV ECHO MEAS - PA MEAN PG: 2.4 MMHG
BH CV ECHO MEAS - PA V2 MAX: 99.3 CM/SEC
BH CV ECHO MEAS - PA V2 MEAN: 74.7 CM/SEC
BH CV ECHO MEAS - PA V2 VTI: 24.5 CM
BH CV ECHO MEAS - PULM A REVS DUR: 0.16 SEC
BH CV ECHO MEAS - PULM A REVS VEL: 28.7 CM/SEC
BH CV ECHO MEAS - PULM DIAS VEL: 55.9 CM/SEC
BH CV ECHO MEAS - PULM S/D: 1.4
BH CV ECHO MEAS - PULM SYS VEL: 78.7 CM/SEC
BH CV ECHO MEAS - PVA(I,A): 3.1 CM^2
BH CV ECHO MEAS - PVA(I,D): 3.1 CM^2
BH CV ECHO MEAS - PVA(V,A): 3 CM^2
BH CV ECHO MEAS - PVA(V,D): 3 CM^2
BH CV ECHO MEAS - QP/QS: 0.58
BH CV ECHO MEAS - RAP SYSTOLE: 3 MMHG
BH CV ECHO MEAS - RV MAX PG: 1.1 MMHG
BH CV ECHO MEAS - RV MEAN PG: 0.63 MMHG
BH CV ECHO MEAS - RV V1 MAX: 52.7 CM/SEC
BH CV ECHO MEAS - RV V1 MEAN: 36.9 CM/SEC
BH CV ECHO MEAS - RV V1 VTI: 13.2 CM
BH CV ECHO MEAS - RVDD: 3.6 CM
BH CV ECHO MEAS - RVOT AREA: 5.7 CM^2
BH CV ECHO MEAS - RVOT DIAM: 2.7 CM
BH CV ECHO MEAS - RVSP: 27.9 MMHG
BH CV ECHO MEAS - SI(AO): 125.1 ML/M^2
BH CV ECHO MEAS - SI(CUBED): 27.6 ML/M^2
BH CV ECHO MEAS - SI(LVOT): 58.8 ML/M^2
BH CV ECHO MEAS - SI(MOD-SP2): 35.7 ML/M^2
BH CV ECHO MEAS - SI(MOD-SP4): 35.1 ML/M^2
BH CV ECHO MEAS - SI(TEICH): 25.4 ML/M^2
BH CV ECHO MEAS - SV(AO): 274.8 ML
BH CV ECHO MEAS - SV(CUBED): 60.6 ML
BH CV ECHO MEAS - SV(LVOT): 129.1 ML
BH CV ECHO MEAS - SV(MOD-SP2): 78.4 ML
BH CV ECHO MEAS - SV(MOD-SP4): 77 ML
BH CV ECHO MEAS - SV(RVOT): 75.4 ML
BH CV ECHO MEAS - SV(TEICH): 55.7 ML
BH CV ECHO MEAS - TR MAX VEL: 249.2 CM/SEC

## 2020-11-19 NOTE — TELEPHONE ENCOUNTER
Her nuclear study was borderline.  There was a question about potential ischemia in the inferior wall.  The study was not a great study and there was attenuation of the inferior wall.  The echocardiogram to look good.  Heart muscle function was normal.  There was just a very small amount of mitral and tricuspid insufficiency which should not be causing any symptoms at all.  If she is having symptoms, she should consider a heart catheterization

## 2020-11-19 NOTE — TELEPHONE ENCOUNTER
Spoke with patient regarding her results. She states she did have an episode of afib yesterday. No chest pain but she does have some shortness of breath on exertion. She has follow up appointment scheduled with dr fofana on 12/2. She states she will discuss further at her appointment with him but will call us or go to ER if she has any symptoms of chest pain/sob.

## 2020-12-02 ENCOUNTER — TELEMEDICINE (OUTPATIENT)
Dept: CARDIOLOGY | Facility: CLINIC | Age: 70
End: 2020-12-02

## 2020-12-02 VITALS
DIASTOLIC BLOOD PRESSURE: 79 MMHG | SYSTOLIC BLOOD PRESSURE: 129 MMHG | BODY MASS INDEX: 36.04 KG/M2 | HEART RATE: 59 BPM | HEIGHT: 68 IN

## 2020-12-02 DIAGNOSIS — R94.39 ABNORMAL NUCLEAR STRESS TEST: ICD-10-CM

## 2020-12-02 DIAGNOSIS — Z01.818 PRE-OP TESTING: ICD-10-CM

## 2020-12-02 DIAGNOSIS — E78.5 HYPERLIPIDEMIA, UNSPECIFIED HYPERLIPIDEMIA TYPE: ICD-10-CM

## 2020-12-02 DIAGNOSIS — I20.9 ANGINA PECTORIS (HCC): Primary | ICD-10-CM

## 2020-12-02 DIAGNOSIS — E66.01 MORBIDLY OBESE (HCC): ICD-10-CM

## 2020-12-02 DIAGNOSIS — I48.0 PAROXYSMAL ATRIAL FIBRILLATION (HCC): ICD-10-CM

## 2020-12-02 DIAGNOSIS — Z79.01 CURRENT USE OF LONG TERM ANTICOAGULATION: ICD-10-CM

## 2020-12-02 DIAGNOSIS — I49.3 PREMATURE VENTRICULAR CONTRACTIONS: ICD-10-CM

## 2020-12-02 DIAGNOSIS — I10 ESSENTIAL HYPERTENSION: ICD-10-CM

## 2020-12-02 PROCEDURE — 99215 OFFICE O/P EST HI 40 MIN: CPT | Performed by: INTERNAL MEDICINE

## 2020-12-02 RX ORDER — CARVEDILOL 25 MG/1
25 TABLET ORAL 2 TIMES DAILY WITH MEALS
COMMUNITY
End: 2021-06-09 | Stop reason: HOSPADM

## 2020-12-02 RX ORDER — NITROGLYCERIN 0.4 MG/1
TABLET SUBLINGUAL
Qty: 25 TABLET | Refills: 3 | Status: SHIPPED | OUTPATIENT
Start: 2020-12-02

## 2020-12-02 NOTE — PROGRESS NOTES
[facility]        Subjective:     Encounter Date:12/02/2020      Patient ID: Mami Wilson is a 70 y.o. female.      HPI: Mami Wilson agreed to a video visit today secondary to the coronavirus pandemic.  She is a very pleasant 70-year-old female who does not report fever or cough.  She does not experience any change in her sense of smell or taste.  Ms. Wilson does report recurrent chest discomfort.  This is described as a tightness or heaviness.  It can occur at rest but also with exertion and is associated with increased dyspnea when she exerts herself.  She denies orthopnea or PND.  She denies syncope or near syncope.  She does have occasional palpitations but no prolonged tacky arrhythmias noted.  She tolerates her anticoagulation for stroke reduction with underlying atrial fibrillation and no significant side effects.  I reviewed with her her recent noninvasive ischemic assessment.  Nuclear stress test from 11/16/2020 revealed preserved left ventricular function with possible inferior ischemia.  Echocardiogram obtained 11/16/2020 revealed preserved left ventricular function no significant valvular abnormality.      The following portions of the patient's history were reviewed and updated as appropriate: allergies, current medications, past family history, past medical history, past social history, past surgical history and problem list.    Problem List:  Patient Active Problem List   Diagnosis   • Hyperlipidemia   • Neuropathy   • Hypertension   • Atrial fibrillation (CMS/HCC)   • GERD (gastroesophageal reflux disease)   • History of echocardiogram   • History of nuclear stress test   • History of cardiac monitoring   • Current use of long term anticoagulation   • Premature ventricular contractions   • Morbidly obese (CMS/HCC)       Past Medical History:  Past Medical History:   Diagnosis Date   • Atrial fibrillation (CMS/HCC)    • GERD (gastroesophageal reflux disease)    • History of  cardiac monitoring     5/17/18 - Abnormal holter monitor for frequent short run of atrial tachycardia/atrial fibrillation with frequent premature ventricular ectopy.   • History of echocardiogram     8/09/17 - LV normal size. Mild MR and TR. EF 61%.  4/03/14 - STRESS ECHO - Normal treadmill stress. No echocardiographic evidence of myocardial ischemia.   • History of nuclear stress test     01/30/2019--Fair exercise tolerance. Baseline HTN with hypertensive response. EKG changes sinus bradycardia. Small reversible defect in the mid anterior wall. EF 68%. Patient went 6min and 11 sec on Branden protocol.   • Hyperlipidemia    • Hypertension    • Neuropathy        Past Surgical History:  Past Surgical History:   Procedure Laterality Date   • BLADDER REPAIR     • CATARACT EXTRACTION     • KNEE ARTHROSCOPY     • TONSILLECTOMY         Social History:  Social History     Socioeconomic History   • Marital status:      Spouse name: Not on file   • Number of children: Not on file   • Years of education: Not on file   • Highest education level: Not on file   Tobacco Use   • Smoking status: Former Smoker   • Smokeless tobacco: Never Used   Substance and Sexual Activity   • Alcohol use: Not Currently   • Drug use: Never   • Sexual activity: Defer       Allergies:  Allergies   Allergen Reactions   • Digoxin Other (See Comments)     .facial tigling         ROS:  Review of Systems   Constitution: Positive for malaise/fatigue.   HENT: Negative for hearing loss and nosebleeds.    Eyes: Negative for double vision and visual disturbance.   Cardiovascular: Positive for chest pain and dyspnea on exertion. Negative for claudication, near-syncope, orthopnea, palpitations, paroxysmal nocturnal dyspnea and syncope.   Respiratory: Negative for cough, shortness of breath, sleep disturbances due to breathing, snoring, sputum production and wheezing.    Endocrine: Negative for cold intolerance, heat intolerance, polydipsia and polyuria.  "  Hematologic/Lymphatic: Negative for adenopathy and bleeding problem. Does not bruise/bleed easily.   Skin: Negative for flushing and itching.   Musculoskeletal: Negative for back pain, falls, joint pain, joint swelling, muscle weakness and neck pain.   Gastrointestinal: Negative for abdominal pain, dysphagia, heartburn, nausea and vomiting.   Genitourinary: Negative for dysuria and frequency.   Neurological: Negative for disturbances in coordination, dizziness, light-headedness, loss of balance, numbness and weakness.   Psychiatric/Behavioral: Negative for altered mental status and memory loss. The patient is not nervous/anxious.    Allergic/Immunologic: Negative for hives and persistent infections.          Objective:         /79   Pulse 59   Ht 172.7 cm (68\")   BMI 36.04 kg/m²     Physical Exam not performed    In-Office Procedure(s):  Procedures    ASCVD RIsk Score::  The ASCVD Risk score (Hermelinda MCKEON Jr., et al., 2013) failed to calculate for the following reasons:    Cannot find a previous HDL lab    Cannot find a previous total cholesterol lab        Assessment/Plan:         1. Angina pectoris (CMS/HCC)  Class III    2. Abnormal nuclear stress test  Possible inferior ischemia    3. Paroxysmal atrial fibrillation (CMS/HCC)  Stable    4. Current use of long term anticoagulation  Stable    5. Essential hypertension  Controlled    6. Hyperlipidemia, unspecified hyperlipidemia type  Continue low-cholesterol low saturated fat diet and Zocor    7. Premature ventricular contractions  Mildly symptomatic    8. Morbidly obese (CMS/HCC)  Encourage weight reduction    Had a long discussion with Ms. Wilson today reviewing her diagnostic testing in association to her ongoing anginal symptoms.  I will provide her with sublingual nitroglycerin on a as needed basis.  I recommended we proceed to coronary angiography.  I reviewed the risk and benefits she understands and is prepared to proceed.  She does not have an " allergy to contrast iodine or shellfish.  I discussed also with her the importance of continued risk modification which she understands.  I spent 30 minutes on video visit and 12 minutes completing electronic medical record documentation.       Miller Gonzales MD  12/02/20  .

## 2020-12-02 NOTE — H&P (VIEW-ONLY)
[facility]        Subjective:     Encounter Date:12/02/2020      Patient ID: Mami Wilson is a 70 y.o. female.      HPI: Mami Wilson agreed to a video visit today secondary to the coronavirus pandemic.  She is a very pleasant 70-year-old female who does not report fever or cough.  She does not experience any change in her sense of smell or taste.  Ms. Wilson does report recurrent chest discomfort.  This is described as a tightness or heaviness.  It can occur at rest but also with exertion and is associated with increased dyspnea when she exerts herself.  She denies orthopnea or PND.  She denies syncope or near syncope.  She does have occasional palpitations but no prolonged tacky arrhythmias noted.  She tolerates her anticoagulation for stroke reduction with underlying atrial fibrillation and no significant side effects.  I reviewed with her her recent noninvasive ischemic assessment.  Nuclear stress test from 11/16/2020 revealed preserved left ventricular function with possible inferior ischemia.  Echocardiogram obtained 11/16/2020 revealed preserved left ventricular function no significant valvular abnormality.      The following portions of the patient's history were reviewed and updated as appropriate: allergies, current medications, past family history, past medical history, past social history, past surgical history and problem list.    Problem List:  Patient Active Problem List   Diagnosis   • Hyperlipidemia   • Neuropathy   • Hypertension   • Atrial fibrillation (CMS/HCC)   • GERD (gastroesophageal reflux disease)   • History of echocardiogram   • History of nuclear stress test   • History of cardiac monitoring   • Current use of long term anticoagulation   • Premature ventricular contractions   • Morbidly obese (CMS/HCC)       Past Medical History:  Past Medical History:   Diagnosis Date   • Atrial fibrillation (CMS/HCC)    • GERD (gastroesophageal reflux disease)    • History of  cardiac monitoring     5/17/18 - Abnormal holter monitor for frequent short run of atrial tachycardia/atrial fibrillation with frequent premature ventricular ectopy.   • History of echocardiogram     8/09/17 - LV normal size. Mild MR and TR. EF 61%.  4/03/14 - STRESS ECHO - Normal treadmill stress. No echocardiographic evidence of myocardial ischemia.   • History of nuclear stress test     01/30/2019--Fair exercise tolerance. Baseline HTN with hypertensive response. EKG changes sinus bradycardia. Small reversible defect in the mid anterior wall. EF 68%. Patient went 6min and 11 sec on Branden protocol.   • Hyperlipidemia    • Hypertension    • Neuropathy        Past Surgical History:  Past Surgical History:   Procedure Laterality Date   • BLADDER REPAIR     • CATARACT EXTRACTION     • KNEE ARTHROSCOPY     • TONSILLECTOMY         Social History:  Social History     Socioeconomic History   • Marital status:      Spouse name: Not on file   • Number of children: Not on file   • Years of education: Not on file   • Highest education level: Not on file   Tobacco Use   • Smoking status: Former Smoker   • Smokeless tobacco: Never Used   Substance and Sexual Activity   • Alcohol use: Not Currently   • Drug use: Never   • Sexual activity: Defer       Allergies:  Allergies   Allergen Reactions   • Digoxin Other (See Comments)     .facial tigling         ROS:  Review of Systems   Constitution: Positive for malaise/fatigue.   HENT: Negative for hearing loss and nosebleeds.    Eyes: Negative for double vision and visual disturbance.   Cardiovascular: Positive for chest pain and dyspnea on exertion. Negative for claudication, near-syncope, orthopnea, palpitations, paroxysmal nocturnal dyspnea and syncope.   Respiratory: Negative for cough, shortness of breath, sleep disturbances due to breathing, snoring, sputum production and wheezing.    Endocrine: Negative for cold intolerance, heat intolerance, polydipsia and polyuria.  "  Hematologic/Lymphatic: Negative for adenopathy and bleeding problem. Does not bruise/bleed easily.   Skin: Negative for flushing and itching.   Musculoskeletal: Negative for back pain, falls, joint pain, joint swelling, muscle weakness and neck pain.   Gastrointestinal: Negative for abdominal pain, dysphagia, heartburn, nausea and vomiting.   Genitourinary: Negative for dysuria and frequency.   Neurological: Negative for disturbances in coordination, dizziness, light-headedness, loss of balance, numbness and weakness.   Psychiatric/Behavioral: Negative for altered mental status and memory loss. The patient is not nervous/anxious.    Allergic/Immunologic: Negative for hives and persistent infections.          Objective:         /79   Pulse 59   Ht 172.7 cm (68\")   BMI 36.04 kg/m²     Physical Exam not performed    In-Office Procedure(s):  Procedures    ASCVD RIsk Score::  The ASCVD Risk score (Hermelinda MCKEON Jr., et al., 2013) failed to calculate for the following reasons:    Cannot find a previous HDL lab    Cannot find a previous total cholesterol lab        Assessment/Plan:         1. Angina pectoris (CMS/HCC)  Class III    2. Abnormal nuclear stress test  Possible inferior ischemia    3. Paroxysmal atrial fibrillation (CMS/HCC)  Stable    4. Current use of long term anticoagulation  Stable    5. Essential hypertension  Controlled    6. Hyperlipidemia, unspecified hyperlipidemia type  Continue low-cholesterol low saturated fat diet and Zocor    7. Premature ventricular contractions  Mildly symptomatic    8. Morbidly obese (CMS/HCC)  Encourage weight reduction    Had a long discussion with Ms. Jackson today reviewing her diagnostic testing in association to her ongoing anginal symptoms.  I will provide her with sublingual nitroglycerin on a as needed basis.  I recommended we proceed to coronary angiography.  I reviewed the risk and benefits she understands and is prepared to proceed.  She does not have an allergy " to contrast iodine or shellfish.  I discussed also with her the importance of continued risk modification which she understands.  I spent 30 minutes on video visit and 12 minutes completing electronic medical record documentation.       Miller Gonzales MD  12/02/20  .

## 2020-12-03 ENCOUNTER — HOSPITAL ENCOUNTER (OUTPATIENT)
Facility: HOSPITAL | Age: 70
Setting detail: HOSPITAL OUTPATIENT SURGERY
Discharge: HOME | End: 2020-12-03
Attending: INTERNAL MEDICINE | Admitting: INTERNAL MEDICINE

## 2020-12-03 ENCOUNTER — LAB (OUTPATIENT)
Dept: LAB | Facility: HOSPITAL | Age: 70
End: 2020-12-03

## 2020-12-03 VITALS
HEART RATE: 58 BPM | RESPIRATION RATE: 18 BRPM | OXYGEN SATURATION: 99 % | SYSTOLIC BLOOD PRESSURE: 139 MMHG | TEMPERATURE: 97.2 F | HEIGHT: 68 IN | WEIGHT: 241.4 LBS | BODY MASS INDEX: 36.59 KG/M2 | DIASTOLIC BLOOD PRESSURE: 73 MMHG

## 2020-12-03 DIAGNOSIS — R94.39 ABNORMAL NUCLEAR STRESS TEST: ICD-10-CM

## 2020-12-03 DIAGNOSIS — Z01.818 PRE-OP TESTING: ICD-10-CM

## 2020-12-03 DIAGNOSIS — I48.0 PAROXYSMAL ATRIAL FIBRILLATION (HCC): ICD-10-CM

## 2020-12-03 DIAGNOSIS — I20.9 ANGINA PECTORIS (HCC): ICD-10-CM

## 2020-12-03 LAB
ALBUMIN SERPL-MCNC: 4.2 G/DL (ref 3.5–5.2)
ALBUMIN/GLOB SERPL: 1.9 G/DL
ALP SERPL-CCNC: 60 U/L (ref 39–117)
ALT SERPL W P-5'-P-CCNC: 14 U/L (ref 1–33)
ANION GAP SERPL CALCULATED.3IONS-SCNC: 9 MMOL/L (ref 5–15)
AST SERPL-CCNC: 18 U/L (ref 1–32)
BASOPHILS # BLD AUTO: 0.1 10*3/MM3 (ref 0–0.2)
BASOPHILS NFR BLD AUTO: 1.2 % (ref 0–1.5)
BILIRUB SERPL-MCNC: 0.5 MG/DL (ref 0–1.2)
BUN SERPL-MCNC: 16 MG/DL (ref 8–23)
BUN/CREAT SERPL: 15 (ref 7–25)
CALCIUM SPEC-SCNC: 9.5 MG/DL (ref 8.6–10.5)
CHLORIDE SERPL-SCNC: 106 MMOL/L (ref 98–107)
CO2 SERPL-SCNC: 27 MMOL/L (ref 22–29)
CREAT SERPL-MCNC: 1.07 MG/DL (ref 0.57–1)
DEPRECATED RDW RBC AUTO: 51.2 FL (ref 37–54)
EOSINOPHIL # BLD AUTO: 0.1 10*3/MM3 (ref 0–0.4)
EOSINOPHIL NFR BLD AUTO: 1.3 % (ref 0.3–6.2)
ERYTHROCYTE [DISTWIDTH] IN BLOOD BY AUTOMATED COUNT: 15.7 % (ref 12.3–15.4)
GFR SERPL CREATININE-BSD FRML MDRD: 51 ML/MIN/1.73
GLOBULIN UR ELPH-MCNC: 2.2 GM/DL
GLUCOSE SERPL-MCNC: 120 MG/DL (ref 65–99)
HCT VFR BLD AUTO: 37.8 % (ref 34–46.6)
HGB BLD-MCNC: 12.4 G/DL (ref 12–15.9)
INR PPP: 1 (ref 0.93–1.1)
LYMPHOCYTES # BLD AUTO: 1.1 10*3/MM3 (ref 0.7–3.1)
LYMPHOCYTES NFR BLD AUTO: 20.7 % (ref 19.6–45.3)
MCH RBC QN AUTO: 30.6 PG (ref 26.6–33)
MCHC RBC AUTO-ENTMCNC: 32.9 G/DL (ref 31.5–35.7)
MCV RBC AUTO: 92.8 FL (ref 79–97)
MONOCYTES # BLD AUTO: 0.4 10*3/MM3 (ref 0.1–0.9)
MONOCYTES NFR BLD AUTO: 7.6 % (ref 5–12)
NEUTROPHILS NFR BLD AUTO: 3.5 10*3/MM3 (ref 1.7–7)
NEUTROPHILS NFR BLD AUTO: 69.2 % (ref 42.7–76)
NRBC BLD AUTO-RTO: 0.1 /100 WBC (ref 0–0.2)
PLATELET # BLD AUTO: 319 10*3/MM3 (ref 140–450)
PMV BLD AUTO: 6.9 FL (ref 6–12)
POTASSIUM SERPL-SCNC: 4.3 MMOL/L (ref 3.5–5.2)
PROT SERPL-MCNC: 6.4 G/DL (ref 6–8.5)
PROTHROMBIN TIME: 11 SECONDS (ref 9.6–11.7)
RBC # BLD AUTO: 4.07 10*6/MM3 (ref 3.77–5.28)
SARS-COV-2 RNA PNL SPEC NAA+PROBE: NOT DETECTED
SODIUM SERPL-SCNC: 142 MMOL/L (ref 136–145)
WBC # BLD AUTO: 5.1 10*3/MM3 (ref 3.4–10.8)

## 2020-12-03 PROCEDURE — 99152 MOD SED SAME PHYS/QHP 5/>YRS: CPT | Performed by: INTERNAL MEDICINE

## 2020-12-03 PROCEDURE — 80053 COMPREHEN METABOLIC PANEL: CPT

## 2020-12-03 PROCEDURE — 25010000002 FENTANYL CITRATE (PF) 100 MCG/2ML SOLUTION: Performed by: INTERNAL MEDICINE

## 2020-12-03 PROCEDURE — 85610 PROTHROMBIN TIME: CPT

## 2020-12-03 PROCEDURE — 87635 SARS-COV-2 COVID-19 AMP PRB: CPT

## 2020-12-03 PROCEDURE — 99153 MOD SED SAME PHYS/QHP EA: CPT | Performed by: INTERNAL MEDICINE

## 2020-12-03 PROCEDURE — 93458 L HRT ARTERY/VENTRICLE ANGIO: CPT | Performed by: INTERNAL MEDICINE

## 2020-12-03 PROCEDURE — 85025 COMPLETE CBC W/AUTO DIFF WBC: CPT

## 2020-12-03 PROCEDURE — 0 IOPAMIDOL PER 1 ML: Performed by: INTERNAL MEDICINE

## 2020-12-03 PROCEDURE — 25010000002 MIDAZOLAM PER 1 MG: Performed by: INTERNAL MEDICINE

## 2020-12-03 PROCEDURE — 36415 COLL VENOUS BLD VENIPUNCTURE: CPT

## 2020-12-03 PROCEDURE — C1760 CLOSURE DEV, VASC: HCPCS | Performed by: INTERNAL MEDICINE

## 2020-12-03 PROCEDURE — C9803 HOPD COVID-19 SPEC COLLECT: HCPCS

## 2020-12-03 PROCEDURE — C1894 INTRO/SHEATH, NON-LASER: HCPCS | Performed by: INTERNAL MEDICINE

## 2020-12-03 PROCEDURE — C1769 GUIDE WIRE: HCPCS | Performed by: INTERNAL MEDICINE

## 2020-12-03 RX ORDER — MIDAZOLAM HYDROCHLORIDE 1 MG/ML
INJECTION INTRAMUSCULAR; INTRAVENOUS AS NEEDED
Status: DISCONTINUED | OUTPATIENT
Start: 2020-12-03 | End: 2020-12-03 | Stop reason: HOSPADM

## 2020-12-03 RX ORDER — SODIUM CHLORIDE 9 MG/ML
100 INJECTION, SOLUTION INTRAVENOUS CONTINUOUS
Status: DISCONTINUED | OUTPATIENT
Start: 2020-12-03 | End: 2020-12-03 | Stop reason: HOSPADM

## 2020-12-03 RX ORDER — LIDOCAINE HYDROCHLORIDE 20 MG/ML
INJECTION, SOLUTION INFILTRATION; PERINEURAL AS NEEDED
Status: DISCONTINUED | OUTPATIENT
Start: 2020-12-03 | End: 2020-12-03 | Stop reason: HOSPADM

## 2020-12-03 RX ORDER — SODIUM CHLORIDE 9 MG/ML
30 INJECTION, SOLUTION INTRAVENOUS CONTINUOUS
Status: DISCONTINUED | OUTPATIENT
Start: 2020-12-03 | End: 2020-12-03 | Stop reason: HOSPADM

## 2020-12-03 RX ORDER — ACETAMINOPHEN 325 MG/1
650 TABLET ORAL EVERY 4 HOURS PRN
Status: DISCONTINUED | OUTPATIENT
Start: 2020-12-03 | End: 2020-12-03 | Stop reason: HOSPADM

## 2020-12-03 RX ORDER — FENTANYL CITRATE 50 UG/ML
INJECTION, SOLUTION INTRAMUSCULAR; INTRAVENOUS AS NEEDED
Status: DISCONTINUED | OUTPATIENT
Start: 2020-12-03 | End: 2020-12-03 | Stop reason: HOSPADM

## 2020-12-03 RX ADMIN — SODIUM CHLORIDE 30 ML/HR: 9 INJECTION, SOLUTION INTRAVENOUS at 08:43

## 2020-12-03 NOTE — DISCHARGE INSTRUCTIONS
Post Cath Instructions      Call Dr. Car’s office to schedule a follow up appointment in 2-4 weeks at [phone].  Specific Physician Instructions: ***    1) Drink plenty of fluids for the next 24 hours.  This helps to eliminate the dye used in your procedure through urination.  You may resume a normal diet; however, try to avoid foods that would cause gas or constipation.    2) Sedative medication given to you during your catheterization may decrease your judgement and reaction time for up to 24-48 hours.  Therefore:  a. DO NOT drive or operate hazardous machinery (48 hours)  b. DO NOT consume alcoholic beverages  c. DO NOT make any important/legal decisions  d. Have someone stay with you for at least 24 hours    3) To allow proper healing and prevent bleeding, the following activities are to be strictly avoided for the next 24-48 hours:  a. Excessive bending at wound site  b. Straining (anything that would tense up muscles around the affected puncture site)  c. Lifting objects greater than 5 pounds, pushing, or pulling for 5 days  i. For Arm Cases:  1. No flexing at the puncture site, such as hammering, golfing, bowling, or swinging any objects  ii. For Groin Cases:  1. Refrain from sexual activity  2. Refrain from running or vigorous walking  3. No prolonged sitting or standing  4. Limit stair climbing as much as possible    4) Keep the puncture site clean and dry.  You may remove the dressing tomorrow and replace it with a band-aid for at least one additional day.  Gently clean the site with mild soap and water.  No scrubbing/rubbing and lightly pat the area dry.  Showers are acceptable; however, avoid submerging in water (tub baths, hot tubs, swimming pools, dishwater, etc…) for at least one week.  The site should be completely healed before resuming these activities to reduce the risk of infection.  Check the site often.  Watch for signs and symptoms of infection and notify your physician if any of the  following occur:  a. Bleeding or an increase in swelling at the puncture site  b. Fever  c. Increased soreness around puncture site  d. Foul odor or significant drainage from the puncture site  e. Swelling, redness, or warmth at the puncture site    **A bruise or small “pea sized” lump under the skin at the puncture site is not unusual.  This should disappear within 3-4 weeks.**  5) CONTACT YOUR PHYSICIAN OR CALL 911 IF YOU EXPERIENCE ANY OF THE FOLLOWING:  a. Increased angina (chest pain) or frequent sensations of pressure, burning, pain, or other discomfort in the chest, arm, jaws, or stomach  b. Lightheadedness, dizziness, faint feeling, sweating, or difficulty breathing  c. Odd sensation changes like numbness, tingling, coldness, or pain in the arm or leg in which the catheter was inserted  d. Limb in which the catheter was inserted becomes pale/bluish in color    IMPORTANT:  Although this occurs very rarely, if you should develop bright red or excessive bleeding, feel a “pop” inside at the insertion site, or notice a sudden increase in swelling larger than a walnut, you should call 911.  Hold continuous firm pressure to the access site until emergency personnel arrive.  It is best if someone else can do this for you.

## 2020-12-07 RX ORDER — DILTIAZEM HYDROCHLORIDE 180 MG/1
CAPSULE, COATED, EXTENDED RELEASE ORAL
Qty: 90 CAPSULE | Refills: 1 | Status: SHIPPED | OUTPATIENT
Start: 2020-12-07 | End: 2021-06-08

## 2021-01-10 PROBLEM — I48.0 PAROXYSMAL ATRIAL FIBRILLATION (HCC): Chronic | Status: ACTIVE | Noted: 2020-12-02

## 2021-01-10 PROBLEM — I49.3 PREMATURE VENTRICULAR CONTRACTIONS: Chronic | Status: ACTIVE | Noted: 2019-09-11

## 2021-01-10 PROBLEM — Z79.01 CURRENT USE OF LONG TERM ANTICOAGULATION: Chronic | Status: ACTIVE | Noted: 2019-09-11

## 2021-01-10 PROBLEM — E66.01 MORBIDLY OBESE (HCC): Chronic | Status: ACTIVE | Noted: 2020-03-11

## 2021-01-13 ENCOUNTER — OFFICE VISIT (OUTPATIENT)
Dept: CARDIOLOGY | Facility: CLINIC | Age: 71
End: 2021-01-13

## 2021-01-13 VITALS
OXYGEN SATURATION: 97 % | HEIGHT: 68 IN | HEART RATE: 62 BPM | WEIGHT: 238 LBS | BODY MASS INDEX: 36.07 KG/M2 | DIASTOLIC BLOOD PRESSURE: 66 MMHG | SYSTOLIC BLOOD PRESSURE: 122 MMHG | RESPIRATION RATE: 18 BRPM

## 2021-01-13 DIAGNOSIS — I49.3 PREMATURE VENTRICULAR CONTRACTIONS: Chronic | ICD-10-CM

## 2021-01-13 DIAGNOSIS — E78.5 HYPERLIPIDEMIA, UNSPECIFIED HYPERLIPIDEMIA TYPE: Chronic | ICD-10-CM

## 2021-01-13 DIAGNOSIS — E66.01 MORBIDLY OBESE (HCC): Chronic | ICD-10-CM

## 2021-01-13 DIAGNOSIS — I10 ESSENTIAL HYPERTENSION: Chronic | ICD-10-CM

## 2021-01-13 DIAGNOSIS — I48.0 PAROXYSMAL ATRIAL FIBRILLATION (HCC): Primary | Chronic | ICD-10-CM

## 2021-01-13 DIAGNOSIS — Z79.01 CURRENT USE OF LONG TERM ANTICOAGULATION: Chronic | ICD-10-CM

## 2021-01-13 PROCEDURE — 99214 OFFICE O/P EST MOD 30 MIN: CPT | Performed by: INTERNAL MEDICINE

## 2021-01-13 NOTE — PROGRESS NOTES
"Chief Complaint  Coronary Artery Disease (Cath 12/3/2020 (No PCI)), Atrial Fibrillation, and Hypertension    Subjective    History of Present Illness      Mami Wilson presents to [facility] for continued cardiovascular care.  She is a pleasant 70-year-old female who continues to observe the CDC guidelines during the coronavirus pandemic.  She does not report fever cough or increased shortness of breath.  She does not report any change in her sense of smell.  Ms. Martinez is in good spirits today and is free of chest pain pressure or tightness she does not report any progressive dyspnea on exertion and is free of orthopnea or PND no lower extremity edema.  She has rare palpitations but remains free of syncope or near syncope.  I reviewed her coronary angiogram from 12/3/2020 which revealed no significant epicardial coronary artery disease with large coronary vessels.  Also reviewed her echocardiogram from 11/16/2020 which demonstrated left ventricular ejection fraction 68%, normal diastolic function, no significant valvular abnormality.  She has a history of atrial fibrillation.She is tolerating her medications well including long-term anticoagulation with Xarelto.  Her blood pressure remains well controlled.  She has known hyperlipidemia and is on simvastatin 20 mg daily.    Objective   Vital Signs:   /66 (BP Location: Left arm, Patient Position: Sitting, Cuff Size: Large Adult)   Pulse 62   Resp 18   Ht 172.7 cm (68\")   Wt 108 kg (238 lb)   SpO2 97%   BMI 36.19 kg/m²     Constitutional:       Appearance: Well-developed.   Eyes:      Conjunctiva/sclera: Conjunctivae normal.      Pupils: Pupils are equal, round, and reactive to light.   HENT:      Head: Normocephalic and atraumatic.   Neck:      Musculoskeletal: Neck supple.      Thyroid: No thyromegaly.   Pulmonary:      Effort: Pulmonary effort is normal. No respiratory distress.      Breath sounds: Normal breath sounds. No " wheezing. No rales.   Cardiovascular:      Normal rate. Regular rhythm.      No gallop. No S3 and S4 gallop. Midsystolic click click.      Comments: Irregularly irregular S1S2  Edema:     Peripheral edema absent.   Abdominal:      General: Bowel sounds are normal. There is no distension.      Palpations: Abdomen is soft. There is no abdominal mass.      Tenderness: There is no abdominal tenderness.   Musculoskeletal: Normal range of motion.   Skin:     General: Skin is warm and dry.      Findings: No erythema.   Neurological:      Mental Status: Alert and oriented to person, place, and time.         Result Review :   The following data was reviewed by: Miller Gonzales MD on 01/13/2021:  Common labs    Common Labsle 12/3/20 12/3/20    0749 0749   BUN  16   Creatinine  1.07 (A)   eGFR Non African Am  51 (A)   Sodium  142   Potassium  4.3   Chloride  106   Calcium  9.5   Albumin  4.20   Total Bilirubin  0.5   Alkaline Phosphatase  60   AST (SGOT)  18   ALT (SGPT)  14   WBC 5.10    Hemoglobin 12.4    Hematocrit 37.8    Platelets 319    (A) Abnormal value            Data reviewed: Cardiology studies I reviewed coronary angiogram from 12/3/2020, I reviewed echocardiogram from 11/16/2020          Assessment and Plan    1. Paroxysmal atrial fibrillation (CMS/HCC)  Stable, rare palpitations    2. Current use of long term anticoagulation  Well-tolerated    3. Premature ventricular contractions  Stable    4. Essential hypertension  Controlled    5. Hyperlipidemia, unspecified hyperlipidemia type  Continue low-cholesterol low saturated fat weight reduction diet and simvastatin 20 mg daily    6. Morbidly obese (CMS/HCC)  Encourage weight reduction    Ms. Wilson is stable from a cardiovascular standpoint.  She is asymptomatic free of chest discomfort or respiratory insufficiency.  Her volume status is stable and she is tolerating her medications well including her long-term anticoagulation.  We will continue her current medical  regimen and have encouraged her to observe a low-cholesterol low saturated fat weight reduction diet and continue her long-term anticoagulation I will see her in 3 to 4 months for follow-up.        Follow Up   No follow-ups on file.  Patient was given instructions and counseling regarding her condition or for health maintenance advice. Please see specific information pulled into the AVS if appropriate.

## 2021-03-15 DIAGNOSIS — I10 ESSENTIAL HYPERTENSION: Primary | ICD-10-CM

## 2021-03-15 RX ORDER — LISINOPRIL 40 MG/1
TABLET ORAL
Qty: 90 TABLET | Refills: 3 | Status: SHIPPED | OUTPATIENT
Start: 2021-03-15 | End: 2021-12-21

## 2021-03-31 ENCOUNTER — OFFICE VISIT (OUTPATIENT)
Dept: CARDIOLOGY | Facility: CLINIC | Age: 71
End: 2021-03-31

## 2021-03-31 VITALS
DIASTOLIC BLOOD PRESSURE: 84 MMHG | WEIGHT: 249 LBS | SYSTOLIC BLOOD PRESSURE: 128 MMHG | BODY MASS INDEX: 37.74 KG/M2 | HEART RATE: 69 BPM | HEIGHT: 68 IN | RESPIRATION RATE: 18 BRPM | OXYGEN SATURATION: 96 %

## 2021-03-31 DIAGNOSIS — R53.83 FATIGUE, UNSPECIFIED TYPE: ICD-10-CM

## 2021-03-31 DIAGNOSIS — E66.01 MORBIDLY OBESE (HCC): ICD-10-CM

## 2021-03-31 DIAGNOSIS — E78.5 HYPERLIPIDEMIA, UNSPECIFIED HYPERLIPIDEMIA TYPE: ICD-10-CM

## 2021-03-31 DIAGNOSIS — I10 ESSENTIAL HYPERTENSION: ICD-10-CM

## 2021-03-31 DIAGNOSIS — I48.0 PAROXYSMAL ATRIAL FIBRILLATION (HCC): Primary | ICD-10-CM

## 2021-03-31 DIAGNOSIS — I49.3 PREMATURE VENTRICULAR CONTRACTIONS: ICD-10-CM

## 2021-03-31 DIAGNOSIS — Z79.01 CURRENT USE OF LONG TERM ANTICOAGULATION: ICD-10-CM

## 2021-03-31 DIAGNOSIS — N18.32 STAGE 3B CHRONIC KIDNEY DISEASE (HCC): ICD-10-CM

## 2021-03-31 PROCEDURE — 93000 ELECTROCARDIOGRAM COMPLETE: CPT | Performed by: INTERNAL MEDICINE

## 2021-03-31 PROCEDURE — 99215 OFFICE O/P EST HI 40 MIN: CPT | Performed by: INTERNAL MEDICINE

## 2021-04-21 RX ORDER — CARVEDILOL 12.5 MG/1
TABLET ORAL
Qty: 180 TABLET | Refills: 3 | Status: SHIPPED | OUTPATIENT
Start: 2021-04-21 | End: 2021-06-09 | Stop reason: HOSPADM

## 2021-06-08 ENCOUNTER — HOSPITAL ENCOUNTER (OUTPATIENT)
Facility: HOSPITAL | Age: 71
Setting detail: OBSERVATION
LOS: 1 days | Discharge: HOME | End: 2021-06-09
Attending: INTERNAL MEDICINE | Admitting: INTERNAL MEDICINE

## 2021-06-08 DIAGNOSIS — Z79.899 ENCOUNTER FOR MONITORING FLECAINIDE THERAPY: ICD-10-CM

## 2021-06-08 DIAGNOSIS — N28.9 RENAL INSUFFICIENCY: Primary | ICD-10-CM

## 2021-06-08 DIAGNOSIS — Z51.81 ENCOUNTER FOR MONITORING FLECAINIDE THERAPY: ICD-10-CM

## 2021-06-08 PROCEDURE — U0004 COV-19 TEST NON-CDC HGH THRU: HCPCS | Performed by: INTERNAL MEDICINE

## 2021-06-08 PROCEDURE — U0005 INFEC AGEN DETEC AMPLI PROBE: HCPCS | Performed by: INTERNAL MEDICINE

## 2021-06-08 PROCEDURE — C9803 HOPD COVID-19 SPEC COLLECT: HCPCS

## 2021-06-08 RX ORDER — DILTIAZEM HYDROCHLORIDE 180 MG/1
CAPSULE, COATED, EXTENDED RELEASE ORAL
Qty: 90 CAPSULE | Refills: 1 | Status: SHIPPED | OUTPATIENT
Start: 2021-06-08 | End: 2021-06-09 | Stop reason: HOSPADM

## 2021-06-09 VITALS
HEART RATE: 59 BPM | WEIGHT: 239.4 LBS | DIASTOLIC BLOOD PRESSURE: 62 MMHG | BODY MASS INDEX: 36.28 KG/M2 | RESPIRATION RATE: 18 BRPM | HEIGHT: 68 IN | SYSTOLIC BLOOD PRESSURE: 110 MMHG | TEMPERATURE: 98.4 F | OXYGEN SATURATION: 98 %

## 2021-06-09 LAB
ANION GAP SERPL CALCULATED.3IONS-SCNC: 10.6 MMOL/L (ref 5–15)
BUN SERPL-MCNC: 21 MG/DL (ref 8–23)
BUN/CREAT SERPL: 15.9 (ref 7–25)
CALCIUM SPEC-SCNC: 8.9 MG/DL (ref 8.6–10.5)
CHLORIDE SERPL-SCNC: 106 MMOL/L (ref 98–107)
CO2 SERPL-SCNC: 22.4 MMOL/L (ref 22–29)
CREAT SERPL-MCNC: 1.32 MG/DL (ref 0.57–1)
GFR SERPL CREATININE-BSD FRML MDRD: 40 ML/MIN/1.73
GLUCOSE SERPL-MCNC: 128 MG/DL (ref 65–99)
MAGNESIUM SERPL-MCNC: 1.9 MG/DL (ref 1.6–2.4)
POTASSIUM SERPL-SCNC: 4.2 MMOL/L (ref 3.5–5.2)
QT INTERVAL: 455 MS
SARS-COV-2 ORF1AB RESP QL NAA+PROBE: NOT DETECTED
SODIUM SERPL-SCNC: 139 MMOL/L (ref 136–145)

## 2021-06-09 PROCEDURE — 93005 ELECTROCARDIOGRAM TRACING: CPT | Performed by: NURSE PRACTITIONER

## 2021-06-09 PROCEDURE — 83735 ASSAY OF MAGNESIUM: CPT | Performed by: NURSE PRACTITIONER

## 2021-06-09 PROCEDURE — G0378 HOSPITAL OBSERVATION PER HR: HCPCS

## 2021-06-09 PROCEDURE — 80048 BASIC METABOLIC PNL TOTAL CA: CPT | Performed by: NURSE PRACTITIONER

## 2021-06-09 PROCEDURE — 93010 ELECTROCARDIOGRAM REPORT: CPT | Performed by: INTERNAL MEDICINE

## 2021-06-09 PROCEDURE — 99219 PR INITIAL OBSERVATION CARE/DAY 50 MINUTES: CPT | Performed by: INTERNAL MEDICINE

## 2021-06-09 RX ORDER — PANTOPRAZOLE SODIUM 40 MG/1
40 TABLET, DELAYED RELEASE ORAL
Status: DISCONTINUED | OUTPATIENT
Start: 2021-06-09 | End: 2021-06-09 | Stop reason: HOSPADM

## 2021-06-09 RX ORDER — NITROGLYCERIN 0.4 MG/1
0.4 TABLET SUBLINGUAL
Status: DISCONTINUED | OUTPATIENT
Start: 2021-06-09 | End: 2021-06-09 | Stop reason: HOSPADM

## 2021-06-09 RX ORDER — LISINOPRIL 40 MG/1
40 TABLET ORAL
Status: DISCONTINUED | OUTPATIENT
Start: 2021-06-09 | End: 2021-06-09 | Stop reason: HOSPADM

## 2021-06-09 RX ORDER — DILTIAZEM HYDROCHLORIDE 180 MG/1
180 CAPSULE, COATED, EXTENDED RELEASE ORAL
Status: CANCELLED | OUTPATIENT
Start: 2021-06-10

## 2021-06-09 RX ORDER — CARVEDILOL 12.5 MG/1
12.5 TABLET ORAL 2 TIMES DAILY WITH MEALS
Status: DISCONTINUED | OUTPATIENT
Start: 2021-06-09 | End: 2021-06-09

## 2021-06-09 RX ORDER — POTASSIUM CHLORIDE 750 MG/1
20 TABLET, FILM COATED, EXTENDED RELEASE ORAL DAILY
Status: DISCONTINUED | OUTPATIENT
Start: 2021-06-09 | End: 2021-06-09 | Stop reason: HOSPADM

## 2021-06-09 RX ORDER — SODIUM CHLORIDE 0.9 % (FLUSH) 0.9 %
10 SYRINGE (ML) INJECTION EVERY 12 HOURS SCHEDULED
Status: DISCONTINUED | OUTPATIENT
Start: 2021-06-09 | End: 2021-06-09 | Stop reason: HOSPADM

## 2021-06-09 RX ORDER — FLECAINIDE ACETATE 50 MG/1
50 TABLET ORAL EVERY 12 HOURS SCHEDULED
Status: DISCONTINUED | OUTPATIENT
Start: 2021-06-09 | End: 2021-06-09 | Stop reason: HOSPADM

## 2021-06-09 RX ORDER — SODIUM CHLORIDE 0.9 % (FLUSH) 0.9 %
10 SYRINGE (ML) INJECTION AS NEEDED
Status: DISCONTINUED | OUTPATIENT
Start: 2021-06-09 | End: 2021-06-09 | Stop reason: HOSPADM

## 2021-06-09 RX ORDER — ESCITALOPRAM OXALATE 20 MG/1
20 TABLET ORAL DAILY
Status: DISCONTINUED | OUTPATIENT
Start: 2021-06-09 | End: 2021-06-09 | Stop reason: HOSPADM

## 2021-06-09 RX ORDER — CHOLECALCIFEROL (VITAMIN D3) 125 MCG
5 CAPSULE ORAL NIGHTLY PRN
Status: DISCONTINUED | OUTPATIENT
Start: 2021-06-09 | End: 2021-06-09 | Stop reason: HOSPADM

## 2021-06-09 RX ORDER — CARVEDILOL 6.25 MG/1
6.25 TABLET ORAL 2 TIMES DAILY WITH MEALS
Status: DISCONTINUED | OUTPATIENT
Start: 2021-06-09 | End: 2021-06-09 | Stop reason: HOSPADM

## 2021-06-09 RX ORDER — ATORVASTATIN CALCIUM 20 MG/1
10 TABLET, FILM COATED ORAL DAILY
Status: DISCONTINUED | OUTPATIENT
Start: 2021-06-09 | End: 2021-06-09 | Stop reason: HOSPADM

## 2021-06-09 RX ORDER — DILTIAZEM HYDROCHLORIDE 120 MG/1
120 CAPSULE, COATED, EXTENDED RELEASE ORAL
Status: DISCONTINUED | OUTPATIENT
Start: 2021-06-10 | End: 2021-06-09 | Stop reason: HOSPADM

## 2021-06-09 RX ORDER — DILTIAZEM HYDROCHLORIDE 180 MG/1
180 CAPSULE, COATED, EXTENDED RELEASE ORAL
Status: DISCONTINUED | OUTPATIENT
Start: 2021-06-09 | End: 2021-06-09

## 2021-06-09 RX ORDER — ACETAMINOPHEN 325 MG/1
650 TABLET ORAL EVERY 4 HOURS PRN
Status: DISCONTINUED | OUTPATIENT
Start: 2021-06-09 | End: 2021-06-09 | Stop reason: HOSPADM

## 2021-06-09 RX ORDER — DILTIAZEM HYDROCHLORIDE 120 MG/1
120 CAPSULE, COATED, EXTENDED RELEASE ORAL
Qty: 30 CAPSULE | Refills: 5 | Status: SHIPPED | OUTPATIENT
Start: 2021-06-10 | End: 2021-12-21

## 2021-06-09 RX ORDER — CARVEDILOL 12.5 MG/1
12.5 TABLET ORAL 2 TIMES DAILY WITH MEALS
Status: CANCELLED | OUTPATIENT
Start: 2021-06-09

## 2021-06-09 RX ORDER — CARVEDILOL 6.25 MG/1
6.25 TABLET ORAL 2 TIMES DAILY WITH MEALS
Qty: 60 TABLET | Refills: 5 | Status: SHIPPED | OUTPATIENT
Start: 2021-06-09 | End: 2021-10-21

## 2021-06-09 RX ORDER — FUROSEMIDE 20 MG/1
20 TABLET ORAL DAILY
Status: DISCONTINUED | OUTPATIENT
Start: 2021-06-09 | End: 2021-06-09 | Stop reason: HOSPADM

## 2021-06-09 RX ORDER — FLECAINIDE ACETATE 50 MG/1
50 TABLET ORAL EVERY 12 HOURS SCHEDULED
Qty: 60 TABLET | Refills: 2 | Status: SHIPPED | OUTPATIENT
Start: 2021-06-09

## 2021-06-09 RX ADMIN — FUROSEMIDE 20 MG: 20 TABLET ORAL at 08:37

## 2021-06-09 RX ADMIN — POTASSIUM CHLORIDE 20 MEQ: 750 TABLET, EXTENDED RELEASE ORAL at 08:37

## 2021-06-09 RX ADMIN — ESCITALOPRAM 20 MG: 20 TABLET, FILM COATED ORAL at 08:36

## 2021-06-09 RX ADMIN — ATORVASTATIN CALCIUM 10 MG: 20 TABLET, FILM COATED ORAL at 08:37

## 2021-06-09 RX ADMIN — PANTOPRAZOLE SODIUM 40 MG: 40 TABLET, DELAYED RELEASE ORAL at 06:01

## 2021-06-09 RX ADMIN — DILTIAZEM HYDROCHLORIDE 180 MG: 180 CAPSULE, COATED, EXTENDED RELEASE ORAL at 08:37

## 2021-06-09 RX ADMIN — SODIUM CHLORIDE, PRESERVATIVE FREE 10 ML: 5 INJECTION INTRAVENOUS at 08:37

## 2021-06-09 RX ADMIN — CARVEDILOL 12.5 MG: 12.5 TABLET, FILM COATED ORAL at 08:37

## 2021-06-09 RX ADMIN — LISINOPRIL 40 MG: 40 TABLET ORAL at 08:37

## 2021-06-09 NOTE — H&P
[facility]        Subjective:     Encounter Date:06/08/2021      Patient ID: Mami Wilson is a 71 y.o. female.      No chief complaint on file.  Atrial fibrillation    HPI:  Ms. Wilson is a 71-year-old female with a history of paroxysmal atrial fibrillation.  She states that she was initially diagnosed in 2017.  She also has a history of dyslipidemia, hypertension and neuropathy.  She has a history of GERD.  She underwent c she had an echocardiogram in November 2020 which demonstrated an ejection fraction of 68%, dilated left atrial cavity.  Trace to mild mitral insufficiency and mild tricuspid insufficiency ardiac catheterization in December 2020 which demonstrated elevated left heart filling pressures and normal epicardial anatomy with no atherosclerotic disease of significance.  She has noted some lower extremity edema recently and she states that her primary care provider had placed her on a diuretic.  She presented to [facility] last evening after not feeling well for a day or so.  She was found to be in atrial fibrillation with rapid ventricular response.  She ultimately converted to sinus rhythm and was transferred to [facility].  Her EKG upon admission demonstrated sinus bradycardia otherwise normal.  Her potassium was 4.2 and her magnesium was 1.9.    She remains in sinus rhythm and states that she feels well.  She does have a sinus bradycardia and does complain of fatigue.          Past Medical History:   Diagnosis Date   • Atrial fibrillation (CMS/[facility])    • GERD (gastroesophageal reflux disease)    • History of cardiac monitoring     5/17/18 - Abnormal holter monitor for frequent short run of atrial tachycardia/atrial fibrillation with frequent premature ventricular ectopy.   • History of echocardiogram     8/09/17 - LV normal size. Mild MR and TR. EF 61%.  4/03/14 - STRESS ECHO - Normal treadmill stress. No echocardiographic evidence of myocardial  ischemia.   • History of nuclear stress test     2019--Fair exercise tolerance. Baseline HTN with hypertensive response. EKG changes sinus bradycardia. Small reversible defect in the mid anterior wall. EF 68%. Patient went 6min and 11 sec on Branden protocol.   • Hyperlipidemia    • Hypertension    • Neuropathy          Past Surgical History:   Procedure Laterality Date   • BLADDER REPAIR     • BRAIN SURGERY     • CARDIAC CATHETERIZATION N/A 12/3/2020    Procedure: Left Heart Cath;  Surgeon: Charlie Car MD;  Location: [facility];  Service: Cardiology;  Laterality: N/A;   • CATARACT EXTRACTION     • KNEE ARTHROSCOPY     • TONSILLECTOMY           Social History     Socioeconomic History   • Marital status:      Spouse name: Not on file   • Number of children: Not on file   • Years of education: Not on file   • Highest education level: Not on file   Tobacco Use   • Smoking status: Former Smoker     Quit date:      Years since quittin.4   • Smokeless tobacco: Never Used   Substance and Sexual Activity   • Alcohol use: Not Currently   • Drug use: Never   • Sexual activity: Defer         Allergies   Allergen Reactions   • Digoxin Other (See Comments)     .facial tigling       Current Medications:   Scheduled Meds:atorvastatin, 10 mg, Oral, Daily  carvedilol, 12.5 mg, Oral, BID With Meals  dilTIAZem CD, 180 mg, Oral, Q24H  escitalopram, 20 mg, Oral, Daily  furosemide, 20 mg, Oral, Daily  lisinopril, 40 mg, Oral, Q24H  pantoprazole, 40 mg, Oral, Q AM  potassium chloride, 20 mEq, Oral, Daily  rivaroxaban, 20 mg, Oral, Daily With Dinner  sodium chloride, 10 mL, Intravenous, Q12H      Continuous Infusions:     Review of Systems   Constitutional: Positive for malaise/fatigue. Negative for chills, decreased appetite, fever, weight gain and weight loss.   HENT: Negative for congestion, hoarse voice, nosebleeds and sore throat.    Eyes: Negative for blurred vision, double  "vision and visual disturbance.   Cardiovascular: Negative for chest pain, claudication, dyspnea on exertion, irregular heartbeat, leg swelling, near-syncope, orthopnea, palpitations, paroxysmal nocturnal dyspnea and syncope.   Respiratory: Negative for cough, hemoptysis, shortness of breath, sleep disturbances due to breathing, snoring, sputum production and wheezing.    Endocrine: Negative for cold intolerance, heat intolerance, polydipsia and polyuria.   Hematologic/Lymphatic: Negative for adenopathy and bleeding problem. Does not bruise/bleed easily.   Skin: Negative for flushing, itching, nail changes and rash.   Musculoskeletal: Negative for arthritis, back pain, joint pain, muscle cramps, muscle weakness, myalgias and neck pain.   Gastrointestinal: Negative for bloating, abdominal pain, anorexia, change in bowel habit, constipation, diarrhea, heartburn, hematemesis, hematochezia, jaundice, melena, nausea and vomiting.   Genitourinary: Negative for dysuria, hematuria and nocturia.   Neurological: Negative for brief paralysis, disturbances in coordination, excessive daytime sleepiness, dizziness, headaches, light-headedness, loss of balance, numbness, paresthesias, seizures and vertigo.   Psychiatric/Behavioral: Negative for altered mental status and depression. The patient is not nervous/anxious.    Allergic/Immunologic: Negative for environmental allergies and hives.          Objective:         /75 (BP Location: Right arm, Patient Position: Lying)   Pulse 69   Temp 97.5 °F (36.4 °C) (Oral)   Resp 18   Ht 172.7 cm (68\")   Wt 109 kg (239 lb 6.4 oz)   SpO2 98%   BMI 36.40 kg/m²     Vitals and nursing note reviewed.   Constitutional:       General: Not in acute distress.     Appearance: Healthy appearance. Well-developed and not in distress. Not diaphoretic.   Eyes:      General: No scleral icterus.     Conjunctiva/sclera: Conjunctivae normal.      Pupils: Pupils are equal, round, and reactive to " light.   HENT:      Head: Normocephalic and atraumatic.   Neck:      Thyroid: No thyromegaly.      Lymphadenopathy: No cervical adenopathy.   Pulmonary:      Effort: Pulmonary effort is normal. No respiratory distress.      Breath sounds: Normal breath sounds. No wheezing. No rales.   Cardiovascular:      PMI at left midclavicular line. Bradycardia present. Regular rhythm. Normal S1. Normal S2.      Murmurs: There is no murmur.      No gallop. No S3 and S4 gallop. No click. No rub.   Pulses:     Intact distal pulses. No decreased pulses.   Edema:     Peripheral edema ( Trivial bilateral lower extremity edema) present.  Abdominal:      General: Bowel sounds are normal. There is no distension.      Palpations: Abdomen is soft. There is no hepatomegaly or abdominal mass.      Tenderness: There is no abdominal tenderness. There is no guarding or rebound.   Musculoskeletal: Normal range of motion.      Cervical back: Normal range of motion and neck supple. Skin:     General: Skin is warm and dry.      Coloration: Skin is not pale.      Findings: No rash.   Neurological:      Mental Status: Alert, oriented to person, place, and time and oriented to person, place and time.      Coordination: Coordination normal.      Gait: Gait is intact.   Psychiatric:         Behavior: Behavior normal.             Y  ASCVD RIsk Score::  The ASCVD Risk score ([place] DC Jr., et al., 2013) failed to calculate for the following reasons:    Cannot find a previous HDL lab    Cannot find a previous total cholesterol lab      Lab Review:   not applicable     Results from last 7 days   Lab Units 06/09/21  0805   SODIUM mmol/L 139   POTASSIUM mmol/L 4.2   CHLORIDE mmol/L 106   CO2 mmol/L 22.4   BUN mg/dL 21   CREATININE mg/dL 1.32*   GLUCOSE mg/dL 128*   CALCIUM mg/dL 8.9       No right upper than that her battery life according to Nick is been very very different from what Carla has been telling her and I am          Results from last 7 days   Lab  Units 06/09/21  0805   MAGNESIUM mg/dL 1.9           Invalid input(s): LDLCALC            Recent Radiology:  Imaging Results (Most Recent)     None                  Assessment:         There are no active hospital problems to display for this patient.  1.  Paroxysmal atrial fibrillation  Initially documented in 2017.  She has been on carvedilol and Cardizem.  Now with recurrence.  Potassium was normal.  Await TSH.  Magnesium normal.  Her heart is structurally normal and she has no ischemic disease.    2.  Essential hypertension    3.  Dyslipidemia    4.  GERD       Plan:       Will ask Dr. Babatunde Che to see for recommendations regarding antiarrhythmic drugs.  Her heart is structurally normal and she is no evidence of ischemic disease.  She has a baseline bradycardia and does complain of feeling fatigued.  I am going to decrease her carvedilol and Cardizem.  Could probably be discharged later today  Follow-up with Dr. Miller Gonzales in 1 month             Priyanka Gonazles MD  06/09/21  09:10 EDT

## 2021-06-09 NOTE — PLAN OF CARE
Goal Outcome Evaluation:  Plan of Care Reviewed With: patient        Progress: no change  Outcome Summary: Pt is A&ox4, direct admit due to afib but converted back into NSR yesterday afternoon. On room air. VSS. Up independently tot he bathroom. Will closely monitor.

## 2021-06-09 NOTE — DISCHARGE SUMMARY
[facility]    DISCHARGE SUMMARY      Patient Name: Mami Wilson  :1950  71 y.o.    Date of Admit: 2021  Date of Discharge:  2021    Discharge Diagnosis:  Problems Addressed this Visit     None      Visit Diagnoses     Renal insufficiency    -  Primary    Relevant Orders    Basic Metabolic Panel    Encounter for monitoring flecainide therapy        Relevant Orders    Basic Metabolic Panel      Diagnoses       Codes Comments    Renal insufficiency    -  Primary ICD-10-CM: N28.9  ICD-9-CM: 593.9     Encounter for monitoring flecainide therapy     ICD-10-CM: Z51.81, Z79.899  ICD-9-CM: V58.83, V58.69         1) PAF  - initial dx in , maintained on coreg and cardizem  - reoccurrence yesterday   - K 4.2, Mg 1.9  - 2D echo 2020 showed EF = 68% with mild TR  - on xarelto for anticoagulation  - currently SB --> coreg decreased to 6.25 mg PO bid and diltiazem CD decreased to 120 mg PO daily  - EKG shows SR,  ms, QT/QTc 455/448    2) Non-obstructive CAD  - cath 2020 showed 40% LAD stenosis, 20% LCx stenosis    3) HTN    4) HLD    5) GERD    6) Renal insufficiency  - Cr 1.32  - in setting of recent increased diuretics Sat, Sun, Mon  - will obtain a BMP in a week    Hospital Course:   Ms. Wilson is a 71-year-old female with a history of paroxysmal atrial fibrillation.  She states that she was initially diagnosed in 2017.  She also has a history of dyslipidemia, hypertension and neuropathy.  She has a history of GERD.  She underwent c she had an echocardiogram in 2020 which demonstrated an ejection fraction of 68%, dilated left atrial cavity.  Trace to mild mitral insufficiency and mild tricuspid insufficiency ardiac catheterization in 2020 which demonstrated elevated left heart filling pressures and normal epicardial anatomy with no atherosclerotic disease of significance.    She has noted some lower extremity edema recently and she states that her primary  care provider had placed her on a diuretic.  She presented to [facility] last evening after not feeling well for a day or so.  She was found to be in atrial fibrillation with rapid ventricular response.  She ultimately converted to sinus rhythm and was transferred to [facility].  Her EKG upon admission demonstrated sinus bradycardia otherwise normal.  Her potassium was 4.2 and her magnesium was 1.9.      She remains in sinus rhythm and states that she feels well.  She does have a sinus bradycardia and does complain of fatigue.  She has a baseline bradycardia and does complain of feeling fatigued.  I am going to decrease her carvedilol and Cardizem.  Her heart is structurally normal and she is no evidence of ischemic disease.  We asked EP opinion from Dr. Che regarding anti-arrhythmic drugs.  He recommended to start on flecainide 50 mg PO bid and obtain a f/u echo in one week.  Cr 1.32 in setting of recently increased diuretics and will repeat BMP on Monday.  CrCl 69.2 currently and acceptable for both current xarelto dosing and flecainide.As d/w Dr. Priaynka Gonzales, we will discharge home today.      Procedures Performed         Consults     No orders found for last 30 day(s).          Pertinent Test Results:   Results from last 7 days   Lab Units 06/09/21  0805   SODIUM mmol/L 139   POTASSIUM mmol/L 4.2   CHLORIDE mmol/L 106   CO2 mmol/L 22.4   BUN mg/dL 21   CREATININE mg/dL 1.32*   CALCIUM mg/dL 8.9   GLUCOSE mg/dL 128*         @LABRCNT(bnp)@          Results from last 7 days   Lab Units 06/09/21  0805   MAGNESIUM mg/dL 1.9           ECHOCARDIOGRAM:    Results for orders placed during the hospital encounter of 11/16/20    Adult Transthoracic Echo Complete W/ Cont if Necessary Per Protocol    Interpretation Summary  · Calculated left ventricular EF = 68% Estimated left ventricular EF was in agreement with the calculated left ventricular EF. Left ventricular systolic function is  normal.  · Left ventricular diastolic function was normal.  · The left atrial cavity is mildly dilated.  · The aortic valve is grossly normal in structure.  · Trace to mild mitral valve regurgitation is present.  · Mild tricuspid valve regurgitation is present.      Physical Exam  Neuro: AAOx3, no gross deficits  CV: S1S2 RRR, no murmur  Resp: non-labored, CTA  GI: BS+, abd soft  Ext: pedal pulses palp, 1+ non-pitting BLE edema  Tele: SR    Condition on Discharge: stable    Discharge Medications     Discharge Medications      New Medications      Instructions Start Date   flecainide 50 MG tablet  Commonly known as: TAMBOCOR   50 mg, Oral, Every 12 Hours Scheduled         Changes to Medications      Instructions Start Date   carvedilol 6.25 MG tablet  Commonly known as: COREG  What changed:   · medication strength  · how much to take  · Another medication with the same name was removed. Continue taking this medication, and follow the directions you see here.   6.25 mg, Oral, 2 Times Daily With Meals      dilTIAZem  MG 24 hr capsule  Commonly known as: CARDIZEM CD  What changed:   · medication strength  · how much to take  · when to take this   120 mg, Oral, Every 24 Hours Scheduled   Start Date: Noemi 10, 2021        Continue These Medications      Instructions Start Date   acetaminophen 325 MG tablet  Commonly known as: TYLENOL   650 mg, Oral, Every 6 Hours PRN      Claritin 10 MG tablet  Generic drug: loratadine   10 mg, Oral, As Needed      escitalopram 20 MG tablet  Commonly known as: LEXAPRO   20 mg, Oral, Daily      furosemide 20 MG tablet  Commonly known as: LASIX   20 mg, Oral, Daily      lisinopril 40 MG tablet  Commonly known as: PRINIVIL,ZESTRIL   TAKE 1 TABLET BY MOUTH EVERY DAY      Magnesium 250 MG tablet   250 mg, Oral, Daily      nitroglycerin 0.4 MG SL tablet  Commonly known as: NITROSTAT   1 under the tongue as needed for angina, may repeat q5mins for up three doses      pantoprazole 40 MG EC  tablet  Commonly known as: PROTONIX   40 mg, Oral, Daily      potassium chloride ER 20 MEQ tablet controlled-release ER tablet  Commonly known as: K-TAB   20 mEq, Oral, Daily      PROBIOTIC ACIDOPHILUS PO   Oral, 2 gummies daily      rivaroxaban 20 MG tablet  Commonly known as: XARELTO   20 mg, Oral, Daily      simvastatin 20 MG tablet  Commonly known as: ZOCOR   20 mg, Oral, Nightly      VITAMIN C GUMMIES PO   1 tablet, Oral, Daily             Discharge Diet: HHD    Activity at Discharge: as tolerated    Discharge disposition: home    Follow-up Appointments  Future Appointments   Date Time Provider Department Center   6/17/2021 10:00 AM Pio Che MD [facility]   8/4/2021 10:30 AM Miller Gonzales MD [facility]     Additional Instructions for the Follow-ups that You Need to Schedule     Basic Metabolic Panel    Jun 14, 2021 (Approximate)      Attention: Dr. Babatunde Che and Dr. Miller Gonzales    Order Comments: Attention: Dr. Babatunde Che and Dr. Miller Gonzales     Release to patient: Immediate               Test Results Pending at Discharge     Electronically signed by JOSE Pat, 06/09/21, 12:29 PM EDT.    Time: > 30 minutes spent on discharge     Cardiology Staff:  I have seen and examined the patient.  I agree with JOSE Angeles assessment and plan.  I saw Ms. Wilson earlier today.  I performed her history and physical.  She had an episode of atrial fibrillation which converted to sinus rhythm.  She has a history of paroxysmal atrial fibrillation.  I spoke with Dr. Babatunde Che regarding her PAF.  Since she has a structurally normal heart and no ischemic disease, it was felt that we could initiate flecainide as an outpatient.  She will follow-up in 1 week with Dr. Che with an EKG.   She will follow up with Dr. Miller Rosa approximately 1 month.  Her lab work and EKGs were reviewed personally by me  Plan is to discharge today.

## 2021-06-10 NOTE — PROGRESS NOTES
Case Management Discharge Note      Final Note: home         Selected Continued Care - Discharged on 6/9/2021 Admission date: 6/8/2021 - Discharge disposition: Home or Self Care    Destination    No services have been selected for the patient.              Durable Medical Equipment    No services have been selected for the patient.              Dialysis/Infusion    No services have been selected for the patient.              Home Medical Care    No services have been selected for the patient.              Therapy    No services have been selected for the patient.              Community Resources    No services have been selected for the patient.              Community & DME    No services have been selected for the patient.                  Transportation Services  Private: Car    Final Discharge Disposition Code: 01 - home or self-care

## 2021-06-17 ENCOUNTER — OFFICE VISIT (OUTPATIENT)
Dept: CARDIOLOGY | Facility: CLINIC | Age: 71
End: 2021-06-17

## 2021-06-17 VITALS
DIASTOLIC BLOOD PRESSURE: 80 MMHG | HEART RATE: 65 BPM | BODY MASS INDEX: 36.98 KG/M2 | SYSTOLIC BLOOD PRESSURE: 146 MMHG | HEIGHT: 68 IN | WEIGHT: 244 LBS

## 2021-06-17 DIAGNOSIS — I48.0 PAROXYSMAL ATRIAL FIBRILLATION (HCC): Primary | ICD-10-CM

## 2021-06-17 DIAGNOSIS — I10 ESSENTIAL HYPERTENSION: Chronic | ICD-10-CM

## 2021-06-17 PROCEDURE — 93000 ELECTROCARDIOGRAM COMPLETE: CPT | Performed by: INTERNAL MEDICINE

## 2021-06-17 PROCEDURE — 99212 OFFICE O/P EST SF 10 MIN: CPT | Performed by: INTERNAL MEDICINE

## 2021-06-17 NOTE — PROGRESS NOTES
Subjective:     Encounter Date:06/17/2021      Patient ID: Mami Wilson is a 71 y.o. female.    Chief Complaint:  Chief Complaint   Patient presents with   • Follow-up       HPI:    Ms Wilson is a 72 yo patient of Dr. Miller Gonzales who presents for followup of her paroxysmal Afib.  Her past medical history is significant for HTN, HLD and neuropathy.  She has had paroxysmal Afib for a couple of years and has been treated with carvedilol and Xarelto.  She had a more severe episode 6/21 and was hospitalized at [facility] where she was found to be in AF with RVR.  She was transferred to [facility] and had converted spontaneously.  She was discharged on flecainide.    Her prior cardiac evaluation has included an echo 11/2020 which showed an EF of 68%, mild MR with LAE and mild TR.  A cath in 12/2020 showed no CAD.    Since her discharge, she has felt well without any recurrent AF.  She has not been having any side effects from the flecainide.         The following portions of the patient's history were reviewed and updated as appropriate: allergies, current medications, past family history, past medical history, past social history, past surgical history and problem list.    Problem List:  Patient Active Problem List   Diagnosis   • Hyperlipidemia   • Neuropathy   • Hypertension   • GERD (gastroesophageal reflux disease)   • History of echocardiogram   • History of nuclear stress test   • History of cardiac monitoring   • Current use of long term anticoagulation   • Premature ventricular contractions   • Morbidly obese (CMS/HCC)   • Abnormal nuclear stress test   • Paroxysmal atrial fibrillation (CMS/HCC)   • Fatigue       Past Medical History:  Past Medical History:   Diagnosis Date   • Atrial fibrillation (CMS/HCC)    • GERD (gastroesophageal reflux disease)    • History of cardiac monitoring     5/17/18 - Abnormal holter monitor for frequent short run of atrial tachycardia/atrial  fibrillation with frequent premature ventricular ectopy.   • History of echocardiogram     17 - LV normal size. Mild MR and TR. EF 61%.  14 - STRESS ECHO - Normal treadmill stress. No echocardiographic evidence of myocardial ischemia.   • History of nuclear stress test     2019--Fair exercise tolerance. Baseline HTN with hypertensive response. EKG changes sinus bradycardia. Small reversible defect in the mid anterior wall. EF 68%. Patient went 6min and 11 sec on Branden protocol.   • Hyperlipidemia    • Hypertension    • Neuropathy        Past Surgical History:  Past Surgical History:   Procedure Laterality Date   • BLADDER REPAIR     • BRAIN SURGERY     • CARDIAC CATHETERIZATION N/A 12/3/2020    Procedure: Left Heart Cath;  Surgeon: Charlie Car MD;  Location: [facility] CATH INVASIVE LOC[facility];  Service: Cardiology;  Laterality: N/A;   • CATARACT EXTRACTION     • KNEE ARTHROSCOPY     • TONSILLECTOMY         Social History:  Social History     Socioeconomic History   • Marital status:      Spouse name: Not on file   • Number of children: Not on file   • Years of education: Not on file   • Highest education level: Not on file   Tobacco Use   • Smoking status: Former Smoker     Quit date: 1970     Years since quittin.4   • Smokeless tobacco: Never Used   Substance and Sexual Activity   • Alcohol use: Not Currently   • Drug use: Never   • Sexual activity: Defer       Allergies:  Allergies   Allergen Reactions   • Digoxin Other (See Comments)     .facial tigling       Immunizations:    There is no immunization history on file for this patient.    ROS:  Review of Systems   Constitutional: Negative for malaise/fatigue.   Cardiovascular: Negative for chest pain, claudication, cyanosis, dyspnea on exertion, irregular heartbeat, leg swelling, near-syncope, orthopnea, palpitations, paroxysmal nocturnal dyspnea and syncope.   Respiratory: Negative for shortness of breath.    All other  "systems reviewed and are negative.         Objective:         /80   Pulse 65   Ht 172.7 cm (68\")   Wt 111 kg (244 lb)   BMI 37.10 kg/m²     Constitutional:       General: Not in acute distress.     Appearance: Well-developed.   Eyes:      General: No scleral icterus.     Conjunctiva/sclera: Conjunctivae normal.      Pupils: Pupils are equal, round, and reactive to light.   HENT:      Head: Normocephalic and atraumatic.   Neck:      Thyroid: No thyromegaly.   Pulmonary:      Effort: Pulmonary effort is normal.      Breath sounds: Normal breath sounds.   Cardiovascular:      Normal rate. Regular rhythm.   Abdominal:      General: Bowel sounds are normal.      Palpations: Abdomen is soft.   Musculoskeletal: Normal range of motion.      Cervical back: Normal range of motion. Skin:     General: Skin is warm and dry.   Neurological:      Mental Status: Alert and oriented to person, place, and time.         In-Office Procedure(s):    ECG 12 Lead    Date/Time: 6/17/2021 10:27 AM  Performed by: Pio Che MD  Authorized by: Pio Che MD   Comparison: compared with previous ECG from 6/9/2021  Comparison to previous ECG: SB, normal  Rhythm: sinus rhythm    Clinical impression: normal ECG            ASCVD RIsk Score::  The ASCVD Risk score (Lebo DC Jr., et al., 2013) failed to calculate for the following reasons:    Cannot find a previous HDL lab    Cannot find a previous total cholesterol lab    Recent Radiology:  Imaging Results (Most Recent)     None          Lab Review:   Admission on 06/08/2021, Discharged on 06/09/2021   Component Date Value   • COVID19 06/08/2021 Not Detected    • Glucose 06/09/2021 128*   • BUN 06/09/2021 21    • Creatinine 06/09/2021 1.32*   • Sodium 06/09/2021 139    • Potassium 06/09/2021 4.2    • Chloride 06/09/2021 106    • CO2 06/09/2021 22.4    • Calcium 06/09/2021 8.9    • eGFR Non  Amer 06/09/2021 40*   • BUN/Creatinine Ratio 06/09/2021 15.9    • Anion Gap " 06/09/2021 10.6    • Magnesium 06/09/2021 1.9    • QT Interval 06/09/2021 455                 Assessment:          Diagnosis Plan   1. Paroxysmal atrial fibrillation (CMS/HCC)  ECG 12 Lead   2. Essential hypertension            Plan:      1.  Paroxysmal atrial fibrillation - [id number] of 3(gender, age, HTN),  tolerating flecainide well, continue Xarelto and flecanide    2.  Essential hypertension -  controlled     3.  Dyslipidemia     4.  GERD    RTC 6 months    Level of Care:                 Pio Che MD  06/17/21  .

## 2021-08-04 ENCOUNTER — OFFICE VISIT (OUTPATIENT)
Dept: CARDIOLOGY | Facility: CLINIC | Age: 71
End: 2021-08-04

## 2021-08-04 VITALS
RESPIRATION RATE: 18 BRPM | BODY MASS INDEX: 36.86 KG/M2 | DIASTOLIC BLOOD PRESSURE: 82 MMHG | SYSTOLIC BLOOD PRESSURE: 124 MMHG | WEIGHT: 243.2 LBS | OXYGEN SATURATION: 97 % | HEIGHT: 68 IN | HEART RATE: 62 BPM

## 2021-08-04 DIAGNOSIS — Z99.89 OBSTRUCTIVE SLEEP APNEA ON CPAP: ICD-10-CM

## 2021-08-04 DIAGNOSIS — E66.01 MORBIDLY OBESE (HCC): ICD-10-CM

## 2021-08-04 DIAGNOSIS — I49.3 PREMATURE VENTRICULAR CONTRACTIONS: ICD-10-CM

## 2021-08-04 DIAGNOSIS — N18.32 STAGE 3B CHRONIC KIDNEY DISEASE (HCC): ICD-10-CM

## 2021-08-04 DIAGNOSIS — G47.33 OBSTRUCTIVE SLEEP APNEA ON CPAP: ICD-10-CM

## 2021-08-04 DIAGNOSIS — E78.5 HYPERLIPIDEMIA, UNSPECIFIED HYPERLIPIDEMIA TYPE: ICD-10-CM

## 2021-08-04 DIAGNOSIS — Z79.01 CURRENT USE OF LONG TERM ANTICOAGULATION: ICD-10-CM

## 2021-08-04 DIAGNOSIS — I10 ESSENTIAL HYPERTENSION: ICD-10-CM

## 2021-08-04 DIAGNOSIS — I48.0 PAROXYSMAL ATRIAL FIBRILLATION (HCC): Primary | ICD-10-CM

## 2021-08-04 PROCEDURE — 99214 OFFICE O/P EST MOD 30 MIN: CPT | Performed by: INTERNAL MEDICINE

## 2021-08-04 NOTE — PROGRESS NOTES
"Chief Complaint  Atrial Fibrillation    Subjective    History of Present Illness      I saw Mami Wilson today for continued cardiovascular care.  She is a very pleasant 71-year-old female who reports feeling well with minimal palpitations.  She has been placed on flecainide 50 mg twice daily by Dr. Che, electrophysiologist.  She denies chest pain pressure tightness.  She is free of any significant or progressive dyspnea on exertion.  She does not experience orthopnea PND D or any progressive lower extremity edema.  She does wear compression stockings bilaterally.  She is free of syncope or near syncope.  She remains on long-term anticoagulation for stroke risk reduction secondary to paroxysmal atrial fibrillation.  Her blood pressure is controlled.  She has a history of hyperlipidemia and is obese with a BMI of 36.98.  She is maintained on simvastatin 20 mg daily.  Her lipids are monitored by her primary care physician Dr. Rufino Carbajal.    Objective   Vital Signs:   /82   Pulse 62   Resp 18   Ht 172.7 cm (68\")   Wt 110 kg (243 lb 3.2 oz)   SpO2 97%   BMI 36.98 kg/m²     Constitutional:       Appearance: Well-developed.   Eyes:      Conjunctiva/sclera: Conjunctivae normal.      Pupils: Pupils are equal, round, and reactive to light.   HENT:      Head: Normocephalic and atraumatic.   Neck:      Thyroid: No thyromegaly.   Pulmonary:      Effort: Pulmonary effort is normal. No respiratory distress.      Breath sounds: Normal breath sounds. No wheezing. No rales.   Cardiovascular:      Normal rate. Regular rhythm.      S4 Gallop. No S3 gallop. Midsystolic click click.   Edema:     Peripheral edema absent.   Abdominal:      General: Bowel sounds are normal. There is no distension.      Palpations: Abdomen is soft. There is no abdominal mass.      Tenderness: There is no abdominal tenderness.   Musculoskeletal: Normal range of motion.      Cervical back: Neck supple. Skin:     General: Skin is warm and dry.      " Findings: No erythema.   Neurological:      Mental Status: Alert and oriented to person, place, and time.     No change in today's physical exam  Result Review :     Common labs    Common Labsle 12/3/20 12/3/20 6/9/21    0749 0749    Glucose  120 (A) 128 (A)   BUN  16 21   Creatinine  1.07 (A) 1.32 (A)   eGFR Non African Am  51 (A) 40 (A)   Sodium  142 139   Potassium  4.3 4.2   Chloride  106 106   Calcium  9.5 8.9   Albumin  4.20    Total Bilirubin  0.5    Alkaline Phosphatase  60    AST (SGOT)  18    ALT (SGPT)  14    WBC 5.10     Hemoglobin 12.4     Hematocrit 37.8     Platelets 319     (A) Abnormal value                      Assessment and Plan    1. Paroxysmal atrial fibrillation (CMS/HCC)  Stable    2. Current use of long term anticoagulation  Well-tolerated    3. Essential hypertension  Controlled    4. Hyperlipidemia, unspecified hyperlipidemia type  Controlled    5. Premature ventricular contractions  Stable    6. Morbidly obese (CMS/HCC)  Encourage weight reduction    7. Stage 3b chronic kidney disease (CMS/HCC)  Stable    8. Obstructive sleep apnea on CPAP  Weight reduction    Ms. Wilson is free of angina and near euvolemic.  Her palpitations are well controlled.  She tolerates her medications well including her long-term anticoagulation.  I have encouraged her to observe a low-cholesterol low saturated fat weight reduction diet and continue CPAP.  She will continue her current medical regimen I will see her in follow-up in 6 months.        Follow Up   No follow-ups on file.  Patient was given instructions and counseling regarding her condition or for health maintenance advice. Please see specific information pulled into the AVS if appropriate.

## 2021-10-21 RX ORDER — CARVEDILOL 6.25 MG/1
TABLET ORAL
Qty: 180 TABLET | Refills: 1 | Status: SHIPPED | OUTPATIENT
Start: 2021-10-21 | End: 2022-08-12

## 2021-12-21 DIAGNOSIS — I10 ESSENTIAL HYPERTENSION: ICD-10-CM

## 2021-12-21 RX ORDER — LISINOPRIL 40 MG/1
TABLET ORAL
Qty: 90 TABLET | Refills: 1 | Status: SHIPPED | OUTPATIENT
Start: 2021-12-21 | End: 2022-06-09

## 2021-12-21 RX ORDER — DILTIAZEM HYDROCHLORIDE 120 MG/1
CAPSULE, COATED, EXTENDED RELEASE ORAL
Qty: 90 CAPSULE | Refills: 1 | Status: SHIPPED | OUTPATIENT
Start: 2021-12-21 | End: 2022-06-09

## 2022-02-15 NOTE — PROGRESS NOTES
"Chief Complaint  No chief complaint on file.    Subjective    History of Present Illness      I saw Mami Pal today for cardiovascular care.  She is a very sweet 71-year-old female.  She is to undergo right knee replacement 3/16/2022.  She remains free of chest pain pressure tightness.  She does not report any significant or progressive dyspnea on exertion.  She denies orthopnea or PND no lower extremity edema.  She is free of syncope or near syncope.  She does report occasional palpitations.  She is known to have paroxysmal atrial fibrillation and remains on flecainide 50 mg twice daily as well as long-term anticoagulation with Xarelto 20 mg daily.  She tolerates her anticoagulation well no significant bruising or bleeding.  Her blood pressure is controlled.  She has a history of hyperlipidemia remains on simvastatin 20 mg daily.  She underwent coronary angiography 12/30/2020 which revealed normal epicardial coronary anatomy without any significant obstructive disease.  Echocardiogram 11/16/2020 reveals normal left ventricular contractility no significant valvular abnormality.    Objective   Vital Signs:   /68   Pulse 64   Ht 172.7 cm (68\")   Wt 112 kg (246 lb)   BMI 37.40 kg/m²     Constitutional:       Appearance: Well-developed.   Eyes:      Conjunctiva/sclera: Conjunctivae normal.      Pupils: Pupils are equal, round, and reactive to light.   HENT:      Head: Normocephalic and atraumatic.   Neck:      Thyroid: No thyromegaly.   Pulmonary:      Effort: Pulmonary effort is normal. No respiratory distress.      Breath sounds: Normal breath sounds. No wheezing. No rales.   Cardiovascular:      Normal rate. Regular rhythm.      S4 Gallop. No S3 gallop. No rub.   Edema:     Peripheral edema absent.   Abdominal:      General: Bowel sounds are normal. There is no distension.      Palpations: Abdomen is soft. There is no abdominal mass.      Tenderness: There is no abdominal tenderness.   Musculoskeletal: " Normal range of motion.      Cervical back: Neck supple. Skin:     General: Skin is warm and dry.      Findings: No erythema.   Neurological:      Mental Status: Alert and oriented to person, place, and time.         Result Review :     Common labs    Common Labsle 6/9/21   Glucose 128 (A)   BUN 21   Creatinine 1.32 (A)   eGFR Non African Am 40 (A)   Sodium 139   Potassium 4.2   Chloride 106   Calcium 8.9   (A) Abnormal value                      Assessment and Plan    1. Paroxysmal atrial fibrillation (HCC)  Stable    2. Current use of long term anticoagulation  Well-tolerated    3. Primary hypertension  Controlled    4. Hyperlipidemia, unspecified hyperlipidemia type  Continue low-cholesterol low saturated fat diet and simvastatin 20 mg daily    5. Premature ventricular contractions  Stable    6. Morbidly obese (HCC)  Encourage weight reduction    7. Fatigue, unspecified type  Stable    8. Preoperative evaluation to rule out surgical contraindication  Suitable candidate from the cardiovascular standpoint to proceed    Mami he is free of angina and euvolemic on exam.  Her blood pressure is controlled.  She tolerates her medications well including her long-term anticoagulation and flecainide.  I feel she is a suitable candidate from the cardiovascular standpoint to proceed with right total knee replacement 3/16/2022.  I encouraged her to participate in physical therapy before and after her knee replacement and to observe a low-cholesterol low saturated fat weight reduction diet.  I will see her in follow-up 6 months sooner if needed.        Follow Up   No follow-ups on file.  Patient was given instructions and counseling regarding her condition or for health maintenance advice. Please see specific information pulled into the AVS if appropriate.

## 2022-02-16 ENCOUNTER — OFFICE VISIT (OUTPATIENT)
Dept: CARDIOLOGY | Facility: CLINIC | Age: 72
End: 2022-02-16

## 2022-02-16 VITALS
HEART RATE: 64 BPM | SYSTOLIC BLOOD PRESSURE: 126 MMHG | BODY MASS INDEX: 37.28 KG/M2 | WEIGHT: 246 LBS | DIASTOLIC BLOOD PRESSURE: 68 MMHG | HEIGHT: 68 IN

## 2022-02-16 DIAGNOSIS — I49.3 PREMATURE VENTRICULAR CONTRACTIONS: ICD-10-CM

## 2022-02-16 DIAGNOSIS — E78.5 HYPERLIPIDEMIA, UNSPECIFIED HYPERLIPIDEMIA TYPE: ICD-10-CM

## 2022-02-16 DIAGNOSIS — R53.83 FATIGUE, UNSPECIFIED TYPE: ICD-10-CM

## 2022-02-16 DIAGNOSIS — I48.0 PAROXYSMAL ATRIAL FIBRILLATION: Primary | ICD-10-CM

## 2022-02-16 DIAGNOSIS — Z01.818 PREOPERATIVE EVALUATION TO RULE OUT SURGICAL CONTRAINDICATION: ICD-10-CM

## 2022-02-16 DIAGNOSIS — Z79.01 CURRENT USE OF LONG TERM ANTICOAGULATION: ICD-10-CM

## 2022-02-16 DIAGNOSIS — I10 PRIMARY HYPERTENSION: ICD-10-CM

## 2022-02-16 DIAGNOSIS — E66.01 MORBIDLY OBESE: ICD-10-CM

## 2022-02-16 PROCEDURE — 99214 OFFICE O/P EST MOD 30 MIN: CPT | Performed by: INTERNAL MEDICINE

## 2022-02-16 RX ORDER — ERGOCALCIFEROL 1.25 MG/1
50000 CAPSULE ORAL WEEKLY
COMMUNITY
End: 2022-10-04

## 2022-06-09 DIAGNOSIS — I10 ESSENTIAL HYPERTENSION: ICD-10-CM

## 2022-06-09 RX ORDER — LISINOPRIL 40 MG/1
TABLET ORAL
Qty: 90 TABLET | Refills: 3 | Status: SHIPPED | OUTPATIENT
Start: 2022-06-09

## 2022-06-09 RX ORDER — DILTIAZEM HYDROCHLORIDE 120 MG/1
CAPSULE, COATED, EXTENDED RELEASE ORAL
Qty: 90 CAPSULE | Refills: 1 | Status: SHIPPED | OUTPATIENT
Start: 2022-06-09 | End: 2022-10-04

## 2022-08-12 RX ORDER — CARVEDILOL 6.25 MG/1
TABLET ORAL
Qty: 180 TABLET | Refills: 3 | Status: SHIPPED | OUTPATIENT
Start: 2022-08-12 | End: 2022-10-04

## 2022-08-16 NOTE — PROGRESS NOTES
"Chief Complaint  No chief complaint on file.    Subjective    History of Present Illness      I saw Mami Wilson today for cardiovascular care.  She is a very pleasant 72-year-old female who is going through a difficult time due to the recent death of her  June of this year.  She is status post right total knee replacement 3/16/2022.  She tolerated procedure well no cardiac complication and her activity level is slowly advancing.  She is to undergo left total knee replacement next month.  She is free of angina does not report any significant respiratory insufficiency.  Overall she feels well free of orthopnea or PND no lower extremity edema.  She denies syncope near syncope or any significant palpitations.  Her blood pressure is controlled.  She tolerates her medications well no significant side effects.    Objective   Vital Signs:   /84   Pulse 60   Ht 172.7 cm (68\")   Wt 108 kg (237 lb)   BMI 36.04 kg/m²     Constitutional:       Appearance: Well-developed.   Eyes:      Conjunctiva/sclera: Conjunctivae normal.      Pupils: Pupils are equal, round, and reactive to light.   HENT:      Head: Normocephalic and atraumatic.   Neck:      Thyroid: No thyromegaly.   Pulmonary:      Effort: Pulmonary effort is normal. No respiratory distress.      Breath sounds: Normal breath sounds. No wheezing. No rales.   Cardiovascular:      Normal rate. Regular rhythm.      S4 Gallop. No S3 gallop. No rub.   Edema:     Peripheral edema absent.   Abdominal:      General: Bowel sounds are normal. There is no distension.      Palpations: Abdomen is soft. There is no abdominal mass.      Tenderness: There is no abdominal tenderness.   Musculoskeletal: Normal range of motion.      Cervical back: Neck supple. Skin:     General: Skin is warm and dry.      Findings: No erythema.   Neurological:      Mental Status: Alert and oriented to person, place, and time.         Result Review :                   Assessment and Plan  "   1. Paroxysmal atrial fibrillation (HCC)  Stable    2. Current use of long termanticoagulation secondary to atrial fibrillation.  I will see her in follow-up in 6 months sooner if needed.  Well-tolerated    3. Premature ventricular contractions  Stable    4. Primary hypertension  Stable    5. Hyperlipidemia, unspecified hyperlipidemia type  Stable    6. Morbidly obese (HCC)  Encourage continued weight reduction    Mami is free of angina euvolemic.  She is stable from cardiovascular standpoint to proceed with left total knee replacement next month.  She will continue her current medical regimen including long-term anticoagulation for stroke risk reduction secondary to atrial fibrillation.  We will see her in follow-up in 6 months sooner if needed.        Follow Up   No follow-ups on file.  Patient was given instructions and counseling regarding her condition or for health maintenance advice. Please see specific information pulled into the AVS if appropriate.

## 2022-08-17 ENCOUNTER — OFFICE VISIT (OUTPATIENT)
Dept: CARDIOLOGY | Facility: CLINIC | Age: 72
End: 2022-08-17

## 2022-08-17 VITALS
HEART RATE: 60 BPM | BODY MASS INDEX: 35.92 KG/M2 | WEIGHT: 237 LBS | SYSTOLIC BLOOD PRESSURE: 138 MMHG | DIASTOLIC BLOOD PRESSURE: 84 MMHG | HEIGHT: 68 IN

## 2022-08-17 DIAGNOSIS — I49.3 PREMATURE VENTRICULAR CONTRACTIONS: ICD-10-CM

## 2022-08-17 DIAGNOSIS — I10 PRIMARY HYPERTENSION: ICD-10-CM

## 2022-08-17 DIAGNOSIS — I48.0 PAROXYSMAL ATRIAL FIBRILLATION: Primary | ICD-10-CM

## 2022-08-17 DIAGNOSIS — Z79.01 CURRENT USE OF LONG TERM ANTICOAGULATION: ICD-10-CM

## 2022-08-17 DIAGNOSIS — E78.5 HYPERLIPIDEMIA, UNSPECIFIED HYPERLIPIDEMIA TYPE: ICD-10-CM

## 2022-08-17 DIAGNOSIS — E66.01 MORBIDLY OBESE: ICD-10-CM

## 2022-08-17 PROCEDURE — 99214 OFFICE O/P EST MOD 30 MIN: CPT | Performed by: INTERNAL MEDICINE

## 2022-10-04 ENCOUNTER — OFFICE VISIT (OUTPATIENT)
Dept: CARDIOLOGY | Facility: CLINIC | Age: 72
End: 2022-10-04

## 2022-10-04 VITALS
RESPIRATION RATE: 18 BRPM | BODY MASS INDEX: 36.19 KG/M2 | DIASTOLIC BLOOD PRESSURE: 85 MMHG | HEART RATE: 118 BPM | OXYGEN SATURATION: 97 % | WEIGHT: 238.8 LBS | SYSTOLIC BLOOD PRESSURE: 145 MMHG | HEIGHT: 68 IN

## 2022-10-04 DIAGNOSIS — Z79.01 CURRENT USE OF LONG TERM ANTICOAGULATION: ICD-10-CM

## 2022-10-04 DIAGNOSIS — I10 PRIMARY HYPERTENSION: ICD-10-CM

## 2022-10-04 DIAGNOSIS — I48.0 PAROXYSMAL ATRIAL FIBRILLATION: ICD-10-CM

## 2022-10-04 DIAGNOSIS — E66.01 MORBIDLY OBESE: ICD-10-CM

## 2022-10-04 DIAGNOSIS — R53.83 FATIGUE, UNSPECIFIED TYPE: ICD-10-CM

## 2022-10-04 DIAGNOSIS — I48.92 ATRIAL FLUTTER WITH RAPID VENTRICULAR RESPONSE: Primary | ICD-10-CM

## 2022-10-04 DIAGNOSIS — I49.3 PREMATURE VENTRICULAR CONTRACTIONS: ICD-10-CM

## 2022-10-04 DIAGNOSIS — E78.5 HYPERLIPIDEMIA, UNSPECIFIED HYPERLIPIDEMIA TYPE: ICD-10-CM

## 2022-10-04 PROCEDURE — 99214 OFFICE O/P EST MOD 30 MIN: CPT | Performed by: INTERNAL MEDICINE

## 2022-10-04 PROCEDURE — 93000 ELECTROCARDIOGRAM COMPLETE: CPT | Performed by: INTERNAL MEDICINE

## 2022-10-04 RX ORDER — ROSUVASTATIN CALCIUM 10 MG/1
10 TABLET, COATED ORAL DAILY
COMMUNITY
Start: 2022-09-22

## 2022-10-04 RX ORDER — DILTIAZEM HYDROCHLORIDE 240 MG/1
240 CAPSULE, COATED, EXTENDED RELEASE ORAL DAILY
Qty: 90 CAPSULE | Refills: 3 | Status: SHIPPED | OUTPATIENT
Start: 2022-10-04

## 2022-10-04 RX ORDER — CARVEDILOL 12.5 MG/1
6.25 TABLET ORAL 2 TIMES DAILY WITH MEALS
Qty: 180 TABLET | Refills: 3 | Status: SHIPPED | OUTPATIENT
Start: 2022-10-04

## 2022-10-04 RX ORDER — HYDROCODONE BITARTRATE AND ACETAMINOPHEN 10; 325 MG/1; MG/1
1 TABLET ORAL EVERY 4 HOURS PRN
COMMUNITY
Start: 2022-09-15

## 2022-10-04 NOTE — PROGRESS NOTES
Chief Complaint  No chief complaint on file.    Subjective    History of Present Illness      I saw Mami Wilson today for cardiovascular care.  She is a pleasant 72-year-old female with a history of paroxysmal atrial fibrillation remains on long-term anticoagulation.  She was placed on flecainide 50 mg twice daily June 2021.  She is remained in sinus rhythm until about 3 weeks ago when she underwent left total knee replacement.  She was seen at [facility] 9/29/2022 with atrial fibrillation and rapid ventricular response.  She was treated with intravenous metoprolol tartrate.  Her carvedilol was increased from 6.25 mg twice daily to 12.5 mg twice daily.  She presents today free of chest pain pressure or tightness.  She does report however increased heart rate.  She does not report orthopnea or PND no significant lower extremity edema.  She denies syncope or near syncope.  She continues to tolerate her medical regimen well.    Objective   Vital Signs:   There were no vitals taken for this visit.    Constitutional:       Appearance: Well-developed.   Eyes:      Conjunctiva/sclera: Conjunctivae normal.      Pupils: Pupils are equal, round, and reactive to light.   HENT:      Head: Normocephalic and atraumatic.   Neck:      Thyroid: No thyromegaly.   Pulmonary:      Effort: Pulmonary effort is normal. No respiratory distress.      Breath sounds: Normal breath sounds. No wheezing. No rales.   Cardiovascular:      Tachycardia present. Irregularly irregular rhythm.      No gallop. No S3 and S4 gallop. No rub.   Edema:     Peripheral edema absent.   Abdominal:      General: Bowel sounds are normal. There is no distension.      Palpations: Abdomen is soft. There is no abdominal mass.      Tenderness: There is no abdominal tenderness.   Musculoskeletal: Normal range of motion.      Cervical back: Neck supple. Skin:     General: Skin is warm and dry.      Findings: No erythema.   Neurological:      Mental Status: Alert  and oriented to person, place, and time.         Result Review :              ECG 12 Lead    Date/Time: 10/4/2022 2:47 PM  Performed by: Miller Gonzales MD  Authorized by: Miller Gonzales MD   Comparison: compared with previous ECG from 6/17/2021  Comparison to previous ECG: Atrial flutter has replaced sinus rhythm  Rhythm: atrial flutter  BPM: 132  Conduction: non-specific intraventricular conduction delay  Other findings: non-specific ST-T wave changes              Assessment and Plan    1. Paroxysmal atrial fibrillation (HCC)  Now atrial flutter with a rapid ventricular response    2. Current use of long term anticoagulation  Well-tolerated    3. Premature ventricular contractions      4. Primary hypertension  Target less than 130/80    5. Hyperlipidemia, unspecified hyperlipidemia type  Continue low-cholesterol diet and rosuvastatin 10 mg daily    6. Morbidly obese (HCC)  Weight reduction    7. Fatigue, unspecified type  Multifactorial    Mami demonstrates atrial flutter with rapid ventricular response.  She will continue her long-term anticoagulation with Xarelto.  Her QRS is prolonged at 116 ms.  We will continue flecainide as it is at 50 mg twice daily.  Her carvedilol has been advanced to 12.5 mg twice daily.  We will increase her Cardizem CD to 240 mg daily.  I will arrange for follow-up with Dr. Che, electrophysiologist, as soon as possible.  I discussed the potential need for change in antiarrhythmic and or ablation.        Follow Up   No follow-ups on file.  Patient was given instructions and counseling regarding her condition or for health maintenance advice. Please see specific information pulled into the AVS if appropriate.

## 2022-10-06 ENCOUNTER — OFFICE VISIT (OUTPATIENT)
Dept: CARDIOLOGY | Facility: CLINIC | Age: 72
End: 2022-10-06

## 2022-10-06 VITALS
BODY MASS INDEX: 36.07 KG/M2 | HEART RATE: 122 BPM | WEIGHT: 238 LBS | SYSTOLIC BLOOD PRESSURE: 120 MMHG | HEIGHT: 68 IN | DIASTOLIC BLOOD PRESSURE: 66 MMHG | RESPIRATION RATE: 18 BRPM

## 2022-10-06 DIAGNOSIS — Z01.818 PRE-OP TESTING: Primary | ICD-10-CM

## 2022-10-06 DIAGNOSIS — I48.0 PAROXYSMAL ATRIAL FIBRILLATION: Chronic | ICD-10-CM

## 2022-10-06 DIAGNOSIS — I48.92 ATRIAL FLUTTER WITH RAPID VENTRICULAR RESPONSE: ICD-10-CM

## 2022-10-06 DIAGNOSIS — I48.92 ATRIAL FLUTTER WITH RAPID VENTRICULAR RESPONSE: Primary | ICD-10-CM

## 2022-10-06 DIAGNOSIS — I10 PRIMARY HYPERTENSION: Chronic | ICD-10-CM

## 2022-10-06 PROCEDURE — 99214 OFFICE O/P EST MOD 30 MIN: CPT | Performed by: INTERNAL MEDICINE

## 2022-10-06 NOTE — PROGRESS NOTES
Subjective:     Encounter Date:10/06/2022      Patient ID: Mami Wilson is a 72 y.o. female.    Chief Complaint:  Chief Complaint   Patient presents with   • Atrial Fibrillation       HPI:  Ms Wilson is a 73 yo patient of Dr. Miller Gonzales who is referred for evaluation of atrial flutter.  Her past medical history is significant for HTN and paroxysmal atrial fibrillation.  She was started on flecainide in 6/2022 which has controlled her AF well until 3 weeks ago.  At that time she had a knee replacement and she subsequently developed tachycardia.  She was seen in the ED in [place] and reportedly had AF with RVR and was treated with IV  B blocker and release.  She was seen by Dr. Gonzales earlier this week and her EKG showed atrial flutter with 2:1 conduction.  She has had some mild palpitations and dyspnea but no chest pain or lightheadedness.  She continues on Coreg, diltiazem, flecainide and Xarelto.    Her prior cardiac evaluation a cath in 12/2020 which showed normal coronaries. An echo 11/2020 showed an EF of 65%, mild MR/TR and mild LAE.      The following portions of the patient's history were reviewed and updated as appropriate: current medications, past family history, past medical history, past social history, past surgical history and problem list.    Problem List:  Patient Active Problem List   Diagnosis   • Hyperlipidemia   • Neuropathy   • Hypertension   • GERD (gastroesophageal reflux disease)   • History of echocardiogram   • History of nuclear stress test   • History of cardiac monitoring   • Current use of long term anticoagulation   • Premature ventricular contractions   • Morbidly obese (HCC)   • Abnormal nuclear stress test   • Paroxysmal atrial fibrillation (HCC)   • Fatigue   • Atrial flutter with rapid ventricular response (HCC)       Active Med List:    Current Outpatient Medications:   •  acetaminophen (TYLENOL) 325 MG tablet, Take 650 mg by mouth Every 6 (Six) Hours As Needed  for Mild Pain ., Disp: , Rfl:   •  carvedilol (COREG) 12.5 MG tablet, Take 0.5 tablets by mouth 2 (Two) Times a Day With Meals., Disp: 180 tablet, Rfl: 3  •  dilTIAZem CD (CARDIZEM CD) 240 MG 24 hr capsule, Take 1 capsule by mouth Daily., Disp: 90 capsule, Rfl: 3  •  escitalopram (LEXAPRO) 20 MG tablet, Take 20 mg by mouth Daily., Disp: , Rfl:   •  flecainide (TAMBOCOR) 50 MG tablet, Take 1 tablet by mouth Every 12 (Twelve) Hours., Disp: 60 tablet, Rfl: 2  •  furosemide (LASIX) 20 MG tablet, Take 20 mg by mouth Daily., Disp: , Rfl:   •  HYDROcodone-acetaminophen (NORCO)  MG per tablet, Take 1 tablet by mouth Every 4 (Four) Hours As Needed. for pain, Disp: , Rfl:   •  lisinopril (PRINIVIL,ZESTRIL) 40 MG tablet, TAKE 1 TABLET BY MOUTH EVERY DAY, Disp: 90 tablet, Rfl: 3  •  loratadine (Claritin) 10 MG tablet, Take 10 mg by mouth As Needed for Allergies., Disp: , Rfl:   •  nitroglycerin (NITROSTAT) 0.4 MG SL tablet, 1 under the tongue as needed for angina, may repeat q5mins for up three doses, Disp: 25 tablet, Rfl: 3  •  pantoprazole (PROTONIX) 40 MG EC tablet, Take 40 mg by mouth Daily., Disp: , Rfl:   •  potassium chloride ER (K-TAB) 20 MEQ tablet controlled-release ER tablet, Take 20 mEq by mouth Daily., Disp: , Rfl:   •  rivaroxaban (XARELTO) 20 MG tablet, Take 20 mg by mouth Daily., Disp: , Rfl:   •  rosuvastatin (CRESTOR) 10 MG tablet, Take 10 mg by mouth Daily., Disp: , Rfl:      Past Medical History:  Past Medical History:   Diagnosis Date   • Atrial fibrillation (HCC)    • GERD (gastroesophageal reflux disease)    • History of cardiac monitoring     5/17/18 - Abnormal holter monitor for frequent short run of atrial tachycardia/atrial fibrillation with frequent premature ventricular ectopy.   • History of echocardiogram     8/09/17 - LV normal size. Mild MR and TR. EF 61%.  4/03/14 - STRESS ECHO - Normal treadmill stress. No echocardiographic evidence of myocardial ischemia.   • History of nuclear stress  "test     2019--Fair exercise tolerance. Baseline HTN with hypertensive response. EKG changes sinus bradycardia. Small reversible defect in the mid anterior wall. EF 68%. Patient went 6min and 11 sec on Branden protocol.   • Hyperlipidemia    • Hypertension    • Neuropathy        Past Surgical History:  Past Surgical History:   Procedure Laterality Date   • BLADDER REPAIR     • BRAIN SURGERY     • CARDIAC CATHETERIZATION N/A 12/3/2020    Procedure: Left Heart Cath;  Surgeon: Charlie Car MD;  Location: [facility] INVASIVE LOCATION;  Service: Cardiology;  Laterality: N/A;   • CATARACT EXTRACTION     • KNEE ARTHROSCOPY     • TONSILLECTOMY         Social History:  Social History     Socioeconomic History   • Marital status:    Tobacco Use   • Smoking status: Former Smoker     Quit date: 1970     Years since quittin.8   • Smokeless tobacco: Never Used   Substance and Sexual Activity   • Alcohol use: Not Currently   • Drug use: Never   • Sexual activity: Defer       Allergies:  Allergies   Allergen Reactions   • Digoxin Other (See Comments)     .facial tigling       Immunizations:    There is no immunization history on file for this patient.       Objective:         Review of Systems   Constitutional: Positive for fatigue.   Respiratory: Positive for shortness of breath.    Cardiovascular: Positive for palpitations. Negative for chest pain and leg swelling.   All other systems reviewed and are negative.       /66   Pulse (!) 122   Resp 18   Ht 172.7 cm (68\")   Wt 108 kg (238 lb)   BMI 36.19 kg/m²     Constitutional:       General: Not in acute distress.     Appearance: Well-developed.   Eyes:      General: No scleral icterus.     Conjunctiva/sclera: Conjunctivae normal.      Pupils: Pupils are equal, round, and reactive to light.   HENT:      Head: Normocephalic and atraumatic.   Neck:      Thyroid: No thyromegaly.   Pulmonary:      Effort: Pulmonary effort is normal.      " Breath sounds: Normal breath sounds.   Cardiovascular:      Normal rate. Irregular rhythm.   Abdominal:      General: Bowel sounds are normal.      Palpations: Abdomen is soft.   Musculoskeletal: Normal range of motion.      Cervical back: Normal range of motion. Skin:     General: Skin is warm and dry.   Neurological:      Mental Status: Alert and oriented to person, place, and time.         In-Office Procedure(s):  Procedures  No orders to display        ASCVD RIsk Score::  The ASCVD Risk score ([place] MIKE Andre., et al., 2013) failed to calculate for the following reasons:    Cannot find a previous HDL lab    Cannot find a previous total cholesterol lab    Recent Radiology:          Lab Review:       Recent labs reviewed and interpreted for clinical significance and application          Assessment:          Diagnosis Plan   1. Atrial flutter with rapid ventricular response (HCC)     2. Paroxysmal atrial fibrillation (HCC)     3. Primary hypertension            Plan:        1. Atrial flutter - appears to be typical, rates not well controlled, she is already anticoagulated.  Discussed indications, risks and benefits of ablation, will schedule.    2. Paroxysmal AF - [id number] of 3(age, gender, HTN) on Xarelto.  Flecainide seemingly was working well.  Will ablate the flutter and continue flecanide and see how she does.    3. HTN - controlled    4. HLD - statin        Level of Care:                 Pio Che MD  10/06/22

## 2022-10-07 ENCOUNTER — LAB (OUTPATIENT)
Dept: LAB | Facility: HOSPITAL | Age: 72
End: 2022-10-07

## 2022-10-07 DIAGNOSIS — Z01.818 PRE-OP TESTING: ICD-10-CM

## 2022-10-07 DIAGNOSIS — I48.92 ATRIAL FLUTTER WITH RAPID VENTRICULAR RESPONSE: ICD-10-CM

## 2022-10-07 LAB
ALBUMIN SERPL-MCNC: 4.2 G/DL (ref 3.5–5.2)
ALBUMIN/GLOB SERPL: 2.2 G/DL
ALP SERPL-CCNC: 79 U/L (ref 39–117)
ALT SERPL W P-5'-P-CCNC: 11 U/L (ref 1–33)
ANION GAP SERPL CALCULATED.3IONS-SCNC: 10.5 MMOL/L (ref 5–15)
AST SERPL-CCNC: 12 U/L (ref 1–32)
BASOPHILS # BLD AUTO: 0.03 10*3/MM3 (ref 0–0.2)
BASOPHILS NFR BLD AUTO: 0.5 % (ref 0–1.5)
BILIRUB SERPL-MCNC: 0.5 MG/DL (ref 0–1.2)
BUN SERPL-MCNC: 18 MG/DL (ref 8–23)
BUN/CREAT SERPL: 16.4 (ref 7–25)
CALCIUM SPEC-SCNC: 9.5 MG/DL (ref 8.6–10.5)
CHLORIDE SERPL-SCNC: 105 MMOL/L (ref 98–107)
CO2 SERPL-SCNC: 25.5 MMOL/L (ref 22–29)
CREAT SERPL-MCNC: 1.1 MG/DL (ref 0.57–1)
DEPRECATED RDW RBC AUTO: 47.9 FL (ref 37–54)
EGFRCR SERPLBLD CKD-EPI 2021: 54.2 ML/MIN/1.73
EOSINOPHIL # BLD AUTO: 0.1 10*3/MM3 (ref 0–0.4)
EOSINOPHIL NFR BLD AUTO: 1.6 % (ref 0.3–6.2)
ERYTHROCYTE [DISTWIDTH] IN BLOOD BY AUTOMATED COUNT: 14.9 % (ref 12.3–15.4)
GLOBULIN UR ELPH-MCNC: 1.9 GM/DL
GLUCOSE SERPL-MCNC: 121 MG/DL (ref 65–99)
HCT VFR BLD AUTO: 33.2 % (ref 34–46.6)
HGB BLD-MCNC: 10.2 G/DL (ref 12–15.9)
IMM GRANULOCYTES # BLD AUTO: 0.02 10*3/MM3 (ref 0–0.05)
IMM GRANULOCYTES NFR BLD AUTO: 0.3 % (ref 0–0.5)
INR PPP: 2.24 (ref 0.9–1.1)
LYMPHOCYTES # BLD AUTO: 1.17 10*3/MM3 (ref 0.7–3.1)
LYMPHOCYTES NFR BLD AUTO: 18.6 % (ref 19.6–45.3)
MCH RBC QN AUTO: 26.8 PG (ref 26.6–33)
MCHC RBC AUTO-ENTMCNC: 30.7 G/DL (ref 31.5–35.7)
MCV RBC AUTO: 87.4 FL (ref 79–97)
MONOCYTES # BLD AUTO: 0.48 10*3/MM3 (ref 0.1–0.9)
MONOCYTES NFR BLD AUTO: 7.6 % (ref 5–12)
NEUTROPHILS NFR BLD AUTO: 4.49 10*3/MM3 (ref 1.7–7)
NEUTROPHILS NFR BLD AUTO: 71.4 % (ref 42.7–76)
NRBC BLD AUTO-RTO: 0.2 /100 WBC (ref 0–0.2)
PLATELET # BLD AUTO: 467 10*3/MM3 (ref 140–450)
PMV BLD AUTO: 9.1 FL (ref 6–12)
POTASSIUM SERPL-SCNC: 4.1 MMOL/L (ref 3.5–5.2)
PROT SERPL-MCNC: 6.1 G/DL (ref 6–8.5)
PROTHROMBIN TIME: 24.2 SECONDS (ref 11.7–14.2)
RBC # BLD AUTO: 3.8 10*6/MM3 (ref 3.77–5.28)
SODIUM SERPL-SCNC: 141 MMOL/L (ref 136–145)
WBC NRBC COR # BLD: 6.29 10*3/MM3 (ref 3.4–10.8)

## 2022-10-07 PROCEDURE — 85025 COMPLETE CBC W/AUTO DIFF WBC: CPT

## 2022-10-07 PROCEDURE — 85610 PROTHROMBIN TIME: CPT

## 2022-10-07 PROCEDURE — 80053 COMPREHEN METABOLIC PANEL: CPT

## 2022-10-07 PROCEDURE — 36415 COLL VENOUS BLD VENIPUNCTURE: CPT

## 2022-10-10 ENCOUNTER — HOSPITAL ENCOUNTER (OUTPATIENT)
Facility: HOSPITAL | Age: 72
Setting detail: HOSPITAL OUTPATIENT SURGERY
Discharge: HOME | End: 2022-10-10
Attending: INTERNAL MEDICINE | Admitting: INTERNAL MEDICINE

## 2022-10-10 VITALS
HEIGHT: 68 IN | RESPIRATION RATE: 18 BRPM | HEART RATE: 88 BPM | WEIGHT: 238 LBS | BODY MASS INDEX: 36.07 KG/M2 | OXYGEN SATURATION: 99 % | TEMPERATURE: 98.2 F | SYSTOLIC BLOOD PRESSURE: 137 MMHG | DIASTOLIC BLOOD PRESSURE: 68 MMHG

## 2022-10-10 DIAGNOSIS — I48.92 ATRIAL FLUTTER WITH RAPID VENTRICULAR RESPONSE: ICD-10-CM

## 2022-10-10 LAB — QT INTERVAL: 351 MS

## 2022-10-10 PROCEDURE — C1730 CATH, EP, 19 OR FEW ELECT: HCPCS | Performed by: INTERNAL MEDICINE

## 2022-10-10 PROCEDURE — C1733 CATH, EP, OTHR THAN COOL-TIP: HCPCS | Performed by: INTERNAL MEDICINE

## 2022-10-10 PROCEDURE — C1731 CATH, EP, 20 OR MORE ELEC: HCPCS | Performed by: INTERNAL MEDICINE

## 2022-10-10 PROCEDURE — C1894 INTRO/SHEATH, NON-LASER: HCPCS | Performed by: INTERNAL MEDICINE

## 2022-10-10 PROCEDURE — 93653 COMPRE EP EVAL TX SVT: CPT | Performed by: INTERNAL MEDICINE

## 2022-10-10 PROCEDURE — 25010000002 MIDAZOLAM PER 1 MG: Performed by: INTERNAL MEDICINE

## 2022-10-10 PROCEDURE — 25010000002 FENTANYL CITRATE (PF) 50 MCG/ML SOLUTION: Performed by: INTERNAL MEDICINE

## 2022-10-10 PROCEDURE — 99152 MOD SED SAME PHYS/QHP 5/>YRS: CPT | Performed by: INTERNAL MEDICINE

## 2022-10-10 PROCEDURE — 93005 ELECTROCARDIOGRAM TRACING: CPT | Performed by: INTERNAL MEDICINE

## 2022-10-10 PROCEDURE — 93010 ELECTROCARDIOGRAM REPORT: CPT | Performed by: INTERNAL MEDICINE

## 2022-10-10 PROCEDURE — C1766 INTRO/SHEATH,STRBLE,NON-PEEL: HCPCS | Performed by: INTERNAL MEDICINE

## 2022-10-10 RX ORDER — SODIUM CHLORIDE 0.9 % (FLUSH) 0.9 %
10 SYRINGE (ML) INJECTION EVERY 12 HOURS SCHEDULED
Status: CANCELLED | OUTPATIENT
Start: 2022-10-10

## 2022-10-10 RX ORDER — SODIUM CHLORIDE 9 MG/ML
75 INJECTION, SOLUTION INTRAVENOUS CONTINUOUS
Status: DISCONTINUED | OUTPATIENT
Start: 2022-10-10 | End: 2022-10-10 | Stop reason: HOSPADM

## 2022-10-10 RX ORDER — SODIUM CHLORIDE 0.9 % (FLUSH) 0.9 %
10 SYRINGE (ML) INJECTION EVERY 12 HOURS SCHEDULED
Status: DISCONTINUED | OUTPATIENT
Start: 2022-10-10 | End: 2022-10-10 | Stop reason: HOSPADM

## 2022-10-10 RX ORDER — SODIUM CHLORIDE 0.9 % (FLUSH) 0.9 %
10 SYRINGE (ML) INJECTION AS NEEDED
Status: DISCONTINUED | OUTPATIENT
Start: 2022-10-10 | End: 2022-10-10 | Stop reason: HOSPADM

## 2022-10-10 RX ORDER — FENTANYL CITRATE 50 UG/ML
INJECTION, SOLUTION INTRAMUSCULAR; INTRAVENOUS
Status: DISCONTINUED | OUTPATIENT
Start: 2022-10-10 | End: 2022-10-10 | Stop reason: HOSPADM

## 2022-10-10 RX ORDER — SODIUM CHLORIDE 0.9 % (FLUSH) 0.9 %
1-10 SYRINGE (ML) INJECTION AS NEEDED
Status: CANCELLED | OUTPATIENT
Start: 2022-10-10

## 2022-10-10 RX ORDER — MIDAZOLAM HYDROCHLORIDE 1 MG/ML
INJECTION INTRAMUSCULAR; INTRAVENOUS
Status: DISCONTINUED | OUTPATIENT
Start: 2022-10-10 | End: 2022-10-10 | Stop reason: HOSPADM

## 2022-10-10 RX ADMIN — SODIUM CHLORIDE 75 ML/HR: 9 INJECTION, SOLUTION INTRAVENOUS at 09:34

## 2022-10-10 NOTE — DISCHARGE SUMMARY
[facility]    DISCHARGE SUMMARY      Patient Name: Mami Wilson  :1950  72 y.o.    Date of Admit: 10/10/2022  Date of Discharge:  10/10/2022    Discharge Diagnosis:  Problems Addressed this Visit        Cardiac and Vasculature    * (Principal) Atrial flutter with rapid ventricular response (HCC)    Relevant Orders    EP/CRM Study   Diagnoses       Codes Comments    Atrial flutter with rapid ventricular response (HCC)     ICD-10-CM: I48.92  ICD-9-CM: 427.32           Hospital Course:  Patient was admitted for flutter ablation.  She was found to have typical atrial flutter and the cavotricuspid isthmus was ablated.  She did have some AF in the lab.  There were no immediate periprocedural complications.      Procedures Performed  Procedure(s):  Ablation atrial flutter  3D MAPPING INSYTE EP       Consults     No orders found from 2022 to 10/11/2022.          Pertinent Test Results:   Results from last 7 days   Lab Units 10/07/22  1001   SODIUM mmol/L 141   POTASSIUM mmol/L 4.1   CHLORIDE mmol/L 105   CO2 mmol/L 25.5   BUN mg/dL 18   CREATININE mg/dL 1.10*   CALCIUM mg/dL 9.5   BILIRUBIN mg/dL 0.5   ALK PHOS U/L 79   ALT (SGPT) U/L 11   AST (SGOT) U/L 12   GLUCOSE mg/dL 121*         @LABRCNT(bnp)@  Results from last 7 days   Lab Units 10/07/22  1001   WBC 10*3/mm3 6.29   HEMOGLOBIN g/dL 10.2*   HEMATOCRIT % 33.2*   PLATELETS 10*3/mm3 467*     Results from last 7 days   Lab Units 10/07/22  1001   INR  2.24*               Condition on Discharge: stable    Discharge Medications     Discharge Medications      Continue These Medications      Instructions Start Date   acetaminophen 325 MG tablet  Commonly known as: TYLENOL   650 mg, Oral, Every 6 Hours PRN      carvedilol 12.5 MG tablet  Commonly known as: COREG   6.25 mg, Oral, 2 Times Daily With Meals      Claritin 10 MG tablet  Generic drug: loratadine   10 mg, Oral, As Needed      dilTIAZem  MG 24 hr capsule  Commonly known as:  CARDIZEM CD   240 mg, Oral, Daily      escitalopram 20 MG tablet  Commonly known as: LEXAPRO   20 mg, Oral, Daily      flecainide 50 MG tablet  Commonly known as: TAMBOCOR   50 mg, Oral, Every 12 Hours Scheduled      furosemide 20 MG tablet  Commonly known as: LASIX   20 mg, Oral, Daily      HYDROcodone-acetaminophen  MG per tablet  Commonly known as: NORCO   1 tablet, Oral, Every 4 Hours PRN, for pain      lisinopril 40 MG tablet  Commonly known as: PRINIVIL,ZESTRIL   TAKE 1 TABLET BY MOUTH EVERY DAY      nitroglycerin 0.4 MG SL tablet  Commonly known as: NITROSTAT   1 under the tongue as needed for angina, may repeat q5mins for up three doses      pantoprazole 40 MG EC tablet  Commonly known as: PROTONIX   40 mg, Oral, Daily      potassium chloride ER 20 MEQ tablet controlled-release ER tablet  Commonly known as: K-TAB   20 mEq, Oral, Daily      rivaroxaban 20 MG tablet  Commonly known as: XARELTO   20 mg, Oral, Daily      rosuvastatin 10 MG tablet  Commonly known as: CRESTOR   10 mg, Oral, Daily             Discharge Diet:     Activity at Discharge:     Discharge disposition: home    Follow-up Appointments  Future Appointments   Date Time Provider Department Center   2/15/2023  1:15 PM Miller Gonzales MD [facility]         Test Results Pending at Discharge       Pio Che MD, FACC    10/10/22  11:08 EDT

## 2022-10-10 NOTE — H&P
Patient Care Team:  Rufino Carbajal MD as PCP - General (Family Medicine)  Miller Gonzales MD as Consulting Physician (Cardiology)    Chief complaint Presents for flutter ablation    Subjective     History of Present Illness  Ms Wilson is a 71 yo patient of Dr. Miller Gonzales who is referred for evaluation of atrial flutter.  Her past medical history is significant for HTN and paroxysmal atrial fibrillation.  She was started on flecainide in 6/2022 which has controlled her AF well until 3 weeks ago.  At that time she had a knee replacement and she subsequently developed tachycardia.  She was seen in the ED in [place] and reportedly had AF with RVR and was treated with IV  B blocker and release.  She was seen by Dr. Gonzales earlier this week and her EKG showed atrial flutter with 2:1 conduction.  She has had some mild palpitations and dyspnea but no chest pain or lightheadedness.  She continues on Coreg, diltiazem, flecainide and Xarelto.     Her prior cardiac evaluation a cath in 12/2020 which showed normal coronaries. An echo 11/2020 showed an EF of 65%, mild MR/TR and mild LAE.     Review of Systems   Constitutional: Positive for fatigue.   Respiratory: Positive for shortness of breath.    Cardiovascular: Positive for palpitations. Negative for chest pain and leg swelling.   All other systems reviewed and are negative.         Past Medical History:   Diagnosis Date   • Atrial fibrillation (HCC)    • GERD (gastroesophageal reflux disease)    • History of cardiac monitoring     5/17/18 - Abnormal holter monitor for frequent short run of atrial tachycardia/atrial fibrillation with frequent premature ventricular ectopy.   • History of echocardiogram     8/09/17 - LV normal size. Mild MR and TR. EF 61%.  4/03/14 - STRESS ECHO - Normal treadmill stress. No echocardiographic evidence of myocardial ischemia.   • History of nuclear stress test     01/30/2019--Fair exercise tolerance. Baseline HTN with hypertensive  response. EKG changes sinus bradycardia. Small reversible defect in the mid anterior wall. EF 68%. Patient went 6min and 11 sec on Branden protocol.   • Hyperlipidemia    • Hypertension    • Neuropathy        Objective      Vital Signs       Physical Exam  Constitutional:       Appearance: Normal appearance.   HENT:      Head: Normocephalic.      Nose: Nose normal.      Mouth/Throat:      Mouth: Mucous membranes are moist.   Eyes:      Pupils: Pupils are equal, round, and reactive to light.   Cardiovascular:      Rate and Rhythm: Rhythm irregular.   Pulmonary:      Breath sounds: Normal breath sounds.   Abdominal:      Palpations: Abdomen is soft.   Musculoskeletal:         General: Normal range of motion.      Cervical back: Neck supple.   Neurological:      General: No focal deficit present.      Mental Status: She is oriented to person, place, and time.   Psychiatric:         Mood and Affect: Mood normal.         Results Review:    I reviewed the patient's new clinical results.      Assessment & Plan       Atrial flutter with rapid ventricular response (HCC)      Assessment & Plan  1. Atrial flutter - appears to be typical, rates not well controlled, she is already anticoagulated.  Discussed indications, risks and benefits of ablation, will schedule.     2. Paroxysmal AF - [id number] of 3(age, gender, HTN) on Xarelto.  Flecainide seemingly was working well.  Will ablate the flutter and continue flecanide and see how she does.     3. HTN - controlled     4. HLD - statin  I discussed the patients findings and my recommendations with patient    Pio Che MD  10/10/22  05:32 EDT

## 2022-10-10 NOTE — DISCHARGE INSTRUCTIONS
Cardiology  [address] [place] [address]  [phone]    Coronary Ablation After Care    Refer to this sheet in the next few weeks. These instructions provide you with information on caring for yourself after your procedure. Your health care provider may also give you more specific instructions. Your treatment has been planned according to current medical practices, but problems sometimes occur. Call your health care provider if you have any problems or questions after your procedure.      What to Expect After the Procedure:  After your procedure, it is typical to have the following sensations:  Minor discomfort or tenderness and a small bump at the catheter insertion site(s). The bump(s) should usually decrease in size and tenderness within 1 to 2 weeks.  Any bruising will usually fade within 2 to 4 weeks.  Home Care Instructions:  Do not apply powder or lotion to the site.  Do not take baths, swim, or use a hot tub until your health care provider approves and the site is completely healed.  Do not bend, squat, or lift anything over 20 lb (9 kg) or as directed by your health care provider. However, we recommend lifting nothing heavier than a gallon of milk.    You may shower 24 hours after the procedure. Remove the bandage (dressing) and gently wash the site with plain soap and water. Gently pat the site dry. You may apply a band aid daily for 2 days if desired.    Inspect the site at least twice daily.  Increase your fluid intake for the next 2 days.    Limit your activity for the first 48 hours.   Avoid strenuous activity for 1 week or as advised by your physician.    Follow instructions about when you can drive or return to work as directed by your physician.    Hold direct pressure over the site when you cough, sneeze, laugh or change positions.  Do this for the next 2 days.    Call Your Doctor If:  You have drainage (other than a small amount of blood on the dressing).  You  have chills or a fever > 101.  You have redness, warmth, swelling (larger than a walnut), or pain at the insertion   You develop palpitations, chest pain or shortness of breath, feel faint, or pass out.  You develop pain, discoloration, coldness, numbness, tingling, or severe bruising in the leg that held the catheter.  You develop bleeding from any other place, such as the bowels.  You have heavy bleeding from the site.  If this happens, hold pressure on the site and call 911.        Make Sure You:  Understand these instructions.  Will watch your condition.  Will get help right away if you are not doing well or get worse.

## 2022-11-14 ENCOUNTER — TELEPHONE (OUTPATIENT)
Dept: CARDIOLOGY | Facility: CLINIC | Age: 72
End: 2022-11-14

## 2022-11-14 NOTE — TELEPHONE ENCOUNTER
Caller: CAROLIN    Relationship: SELF    Best call back number: [phone]    What is the best time to reach you: ANY        What was the call regarding: PT HAD A FOLLOW UP CANCELLED WITH DR. RIVAS ON 11/10/22. SHE WAS TOLD SHE WOULD GET A CALL BACK AND HAS NOT RECEIVED ONE YET.THIS IS HER FIRST APPT AFTER HER PROCEDURE AND SHE WOULD LIKE A CALL BACK ASAP TO SCHEDULE.    Do you require a callback: YES

## (undated) DEVICE — CATH DIAG IMPULSE PIG .056 6F 110CM

## (undated) DEVICE — LOU EP: Brand: MEDLINE INDUSTRIES, INC.

## (undated) DEVICE — PINNACLE INTRODUCER SHEATH: Brand: PINNACLE

## (undated) DEVICE — CATH DIAG IMPULSE FL4 6F 100CM

## (undated) DEVICE — ANGIO-SEAL VIP VASCULAR CLOSURE DEVICE: Brand: ANGIO-SEAL

## (undated) DEVICE — BOWL PLSTC MD 16OZ BLU STRL

## (undated) DEVICE — CATH ABL SAFIRE BIDIR UNIV MD CRV 7F 8MM 2-5-2MM 110CM

## (undated) DEVICE — CATH DIAG IMPULSE FR4 6F 100CM

## (undated) DEVICE — INTRO STEER AGILIS NXT MED/CURL 8.5F

## (undated) DEVICE — CATH EP STEER DUO-DECAPOLAR SUPERLNG 7F 2-5-2MM 95CM

## (undated) DEVICE — PK TRY HEART CATH 50

## (undated) DEVICE — CATH EP INQUIRY DECAPLR CRV 6F 2TO5TO2MM 110CM LG

## (undated) DEVICE — KT ELECTRD EP SURF ENSITE/X ADHS/PTCH 1P/U NS

## (undated) DEVICE — INTRO HEMO FASTCATH CATH/LOCK 7F 12CM

## (undated) DEVICE — SKIN PREP TRAY W/CHG: Brand: MEDLINE INDUSTRIES, INC.

## (undated) DEVICE — GW EMR FIX EXCHG J STD .035 3MM 260CM